# Patient Record
Sex: FEMALE | Race: WHITE
[De-identification: names, ages, dates, MRNs, and addresses within clinical notes are randomized per-mention and may not be internally consistent; named-entity substitution may affect disease eponyms.]

---

## 2021-01-27 ENCOUNTER — TRANSCRIPTION ENCOUNTER (OUTPATIENT)
Age: 46
End: 2021-01-27

## 2022-11-09 PROBLEM — Z00.00 ENCOUNTER FOR PREVENTIVE HEALTH EXAMINATION: Status: ACTIVE | Noted: 2022-11-09

## 2022-11-11 ENCOUNTER — APPOINTMENT (OUTPATIENT)
Dept: PSYCHIATRY | Facility: CLINIC | Age: 47
End: 2022-11-11

## 2022-11-11 PROCEDURE — 90791 PSYCH DIAGNOSTIC EVALUATION: CPT | Mod: 95

## 2022-11-16 ENCOUNTER — APPOINTMENT (OUTPATIENT)
Dept: PSYCHIATRY | Facility: CLINIC | Age: 47
End: 2022-11-16

## 2022-11-16 PROCEDURE — 90837 PSYTX W PT 60 MINUTES: CPT | Mod: 95

## 2022-11-30 ENCOUNTER — NON-APPOINTMENT (OUTPATIENT)
Age: 47
End: 2022-11-30

## 2022-12-02 ENCOUNTER — APPOINTMENT (OUTPATIENT)
Dept: PSYCHIATRY | Facility: CLINIC | Age: 47
End: 2022-12-02

## 2022-12-02 ENCOUNTER — TRANSCRIPTION ENCOUNTER (OUTPATIENT)
Age: 47
End: 2022-12-02

## 2022-12-02 PROCEDURE — 90837 PSYTX W PT 60 MINUTES: CPT | Mod: 95

## 2022-12-09 ENCOUNTER — APPOINTMENT (OUTPATIENT)
Dept: PSYCHIATRY | Facility: CLINIC | Age: 47
End: 2022-12-09

## 2022-12-09 PROCEDURE — 90837 PSYTX W PT 60 MINUTES: CPT | Mod: 95

## 2022-12-16 ENCOUNTER — APPOINTMENT (OUTPATIENT)
Dept: PSYCHIATRY | Facility: CLINIC | Age: 47
End: 2022-12-16

## 2022-12-16 PROCEDURE — 90837 PSYTX W PT 60 MINUTES: CPT | Mod: 95

## 2022-12-23 ENCOUNTER — APPOINTMENT (OUTPATIENT)
Dept: PSYCHIATRY | Facility: CLINIC | Age: 47
End: 2022-12-23

## 2022-12-23 PROCEDURE — 90837 PSYTX W PT 60 MINUTES: CPT | Mod: 95

## 2022-12-30 ENCOUNTER — APPOINTMENT (OUTPATIENT)
Dept: PSYCHIATRY | Facility: CLINIC | Age: 47
End: 2022-12-30
Payer: COMMERCIAL

## 2022-12-30 PROCEDURE — 90837 PSYTX W PT 60 MINUTES: CPT | Mod: 95

## 2023-01-06 ENCOUNTER — APPOINTMENT (OUTPATIENT)
Dept: PSYCHIATRY | Facility: CLINIC | Age: 48
End: 2023-01-06
Payer: COMMERCIAL

## 2023-01-06 PROCEDURE — 90837 PSYTX W PT 60 MINUTES: CPT | Mod: 95

## 2023-01-10 ENCOUNTER — NON-APPOINTMENT (OUTPATIENT)
Age: 48
End: 2023-01-10

## 2023-01-20 ENCOUNTER — APPOINTMENT (OUTPATIENT)
Dept: PSYCHIATRY | Facility: CLINIC | Age: 48
End: 2023-01-20
Payer: COMMERCIAL

## 2023-01-20 PROCEDURE — 90837 PSYTX W PT 60 MINUTES: CPT | Mod: 95

## 2023-01-21 NOTE — PHYSICAL EXAM
[Average] : average [Cooperative] : cooperative [Depressed] : depressed [Anxious] : anxious [Full] : full [Clear] : clear [Linear/Goal Directed] : linear/goal directed [None] : none [None Reported] : none reported [Reexperiencing] : reexperiencing [WNL] : within normal limits [FreeTextEntry1] : adequately groomed, casually dressed [FreeTextEntry8] : sad [de-identified] : mood-congruent, anxious, tearful at times [de-identified] : normal rate/rhythm/volume [FreeTextEntry7] : focused on loss of daughter and trauma triggers

## 2023-01-21 NOTE — REASON FOR VISIT
[Other Location: e.g. School (Enter Location, City,State)___] : at [unfilled], at the time of the visit. [Other Location: e.g. Home (Enter Location, City,State)___] : at [unfilled] [Patient] : Patient [FreeTextEntry1] : "Trauma and grief"
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
## 2023-01-21 NOTE — RISK ASSESSMENT
[Yes] : Yes [No] : No [FreeTextEntry8] : Pt reported her passive SI is decreasing; she is finding she is more survival-oriented and wants to live. Pt was future-oriented.

## 2023-01-27 ENCOUNTER — APPOINTMENT (OUTPATIENT)
Dept: PSYCHIATRY | Facility: CLINIC | Age: 48
End: 2023-01-27
Payer: COMMERCIAL

## 2023-01-27 PROCEDURE — 90837 PSYTX W PT 60 MINUTES: CPT | Mod: 95

## 2023-01-27 NOTE — PHYSICAL EXAM
[Average] : average [Cooperative] : cooperative [Depressed] : depressed [Anxious] : anxious [Full] : full [Clear] : clear [Linear/Goal Directed] : linear/goal directed [None] : none [None Reported] : none reported [Reexperiencing] : reexperiencing [WNL] : within normal limits [FreeTextEntry1] : adequately groomed, casually dressed [FreeTextEntry8] : sad [de-identified] : mood-congruent, anxious, slightly tearful [de-identified] : normal rate/rhythm/volume [FreeTextEntry7] : focused on loss of daughter

## 2023-01-27 NOTE — RISK ASSESSMENT
[Yes] : Yes [No] : No [FreeTextEntry8] : Pt denied SI, including passive SI. She stated she is less reckless and more survival-oriented and wants to live.

## 2023-01-27 NOTE — PLAN
[Prolonged Exposure] : Prolonged Exposure  [Recommended Frequency of Visits: ____] : Recommended frequency of visits: [unfilled] [Return in ____ week(s)] : Return in [unfilled] week(s) [FreeTextEntry2] : \par Problem 1: Re-experiencing sx (i.e., intrusive memories, flashbacks, reactivity to triggers)\par Goal 1: 30% reduction in re-experiencing sx\par \par Problem 2: Negative thoughts and self-blame\par Goal 2: 30% reduction in negative thoughts and self-blame; 30% increase in helpful thinking and peace/acceptance around the loss\par \par Problem 3: Avoidance of locations and situations (e.g., rooms in the apartment, driving over bridges)\par Goal 3: 30% reduction in avoidance of locations and situations\par  [de-identified] : This was PE Session 3. Pt reported she is still experiencing intense anxiety and panic, and she worries it is interfering with her grieving process and PTSD tx. Pt was interested in medication changes. Pt was receptive to writer's offer to make a referral to a [facility] psychiatrist, as she is currently prescribed psych meds by her PCP. Otherwise pt reported the week was hard; she was very triggered by music and made her partner turn it off. Pt completed the HW to conduct her first in vivo exposure, which was to her daughter's bed. She did the exposure one time, with SUDS of 9 before, 2 during, and 2 after the exposure. Pt stated the anticipation was worse than the exposure itself; it brought up more grief than trauma. Writer praised pt completing the first exposure. HW was assigned for pt to conduct next in vivo exposure, to her daughter's clothing and makeup, 2x prior to the next session.  [FreeTextEntry1] : Continue weekly individual therapy. Continue PE Session 3 activities during next session. Continue to monitor sx and measure progress using self-report measures and HW.\par \par Writer will also make a referral for the pt to be evaluated by a [facility] psychiatrist.

## 2023-02-03 ENCOUNTER — APPOINTMENT (OUTPATIENT)
Dept: PSYCHIATRY | Facility: CLINIC | Age: 48
End: 2023-02-03
Payer: COMMERCIAL

## 2023-02-03 PROCEDURE — 90837 PSYTX W PT 60 MINUTES: CPT | Mod: 95

## 2023-02-04 NOTE — PLAN
[Prolonged Exposure] : Prolonged Exposure  [Recommended Frequency of Visits: ____] : Recommended frequency of visits: [unfilled] [Return in ____ week(s)] : Return in [unfilled] week(s) [FreeTextEntry2] : \par Problem 1: Re-experiencing sx (i.e., intrusive memories, flashbacks, reactivity to triggers)\par Goal 1: 30% reduction in re-experiencing sx\par \par Problem 2: Negative thoughts and self-blame\par Goal 2: 30% reduction in negative thoughts and self-blame; 30% increase in helpful thinking and peace/acceptance around the loss\par \par Problem 3: Avoidance of locations and situations (e.g., rooms in the apartment, driving over bridges)\par Goal 3: 30% reduction in avoidance of locations and situations [de-identified] : This was the continuation of PE Session 3. Pt reported she is having a very challenging week due to daughter's 7-month death anniversary; her grief has been intense. Pt stated fortunately she has not had as much panic, but is feeling broken, wondering how to find purpose now as her life revolved around her child. Pt reported she did not do the HW to conduct in vivo exposure to her daughter's clothing and makeup, because she could not go to sister's house where these items are located, due to a sick family member. Pt stated she substituted another in vivo exposure instead; she went to the local grocery store 2x. Pt stated the anticipation was worse than the exposures themselves, her SUDS were 6 prior to going to the store, but fell rapidly to 2-3 during and 1 after exposure. HW was assigned to conduct next in vivo exposure, to taking a long shower 3x prior to the next session.  [FreeTextEntry1] : Continue weekly individual therapy. Continue PE Session 3 activities during next session. Continue to monitor sx and measure progress using self-report measures and HW.\par \par Pt is also scheduled to see [facility] psychiatrist Dr. Yg Palacio next week for an initial evaluation.

## 2023-02-04 NOTE — RISK ASSESSMENT
[No, patient denies ideation or behavior] : No, patient denies ideation or behavior [Yes] : Yes [No] : No [FreeTextEntry8] : Pt denied current SIIP. She reported she scratched herself a lot without realizing it, was itchy and her skin turned red but no blood was drawn. Pt stated she did not do this consciously or with any intent to hurt herself, and she denied SIB and urges to self-harm.

## 2023-02-04 NOTE — PHYSICAL EXAM
[Average] : average [Cooperative] : cooperative [Anxious] : anxious [Full] : full [Clear] : clear [Linear/Goal Directed] : linear/goal directed [None] : none [None Reported] : none reported [Reexperiencing] : reexperiencing [WNL] : within normal limits [FreeTextEntry1] : adequately groomed, casually dressed [FreeTextEntry8] : very sad and depressed [de-identified] : mood-congruent, anxious, tearful [de-identified] : normal rate/rhythm/volume [FreeTextEntry7] : focused on loss of daughter

## 2023-02-09 ENCOUNTER — NON-APPOINTMENT (OUTPATIENT)
Age: 48
End: 2023-02-09

## 2023-02-09 ENCOUNTER — APPOINTMENT (OUTPATIENT)
Dept: PSYCHIATRY | Facility: CLINIC | Age: 48
End: 2023-02-09
Payer: COMMERCIAL

## 2023-02-10 ENCOUNTER — APPOINTMENT (OUTPATIENT)
Dept: PSYCHIATRY | Facility: CLINIC | Age: 48
End: 2023-02-10
Payer: COMMERCIAL

## 2023-02-10 PROCEDURE — 90837 PSYTX W PT 60 MINUTES: CPT | Mod: 95

## 2023-02-15 ENCOUNTER — APPOINTMENT (OUTPATIENT)
Dept: PSYCHIATRY | Facility: CLINIC | Age: 48
End: 2023-02-15

## 2023-02-15 PROCEDURE — 99205 OFFICE O/P NEW HI 60 MIN: CPT | Mod: 95

## 2023-02-15 NOTE — PLAN
[FreeTextEntry4] : 1. Initiate Mirtazapine 7.5 mg qHS for 4 days, then increase to 15 mg qHS for 10 days\par 2. Initiate Ramelteon 8 mg qHS\par 3. Will decrease Trintellix at the following appointment

## 2023-02-15 NOTE — PHYSICAL EXAM
[None] : none [Cooperative] : cooperative [Depressed] : depressed [Labile] : labile [Clear] : clear [Linear/Goal Directed] : linear/goal directed [Depressive] : depressive [Self-Deprecatory] : self-deprecatory [None Reported] : none reported [Average] : average [WNL] : within normal limits

## 2023-02-15 NOTE — DISCUSSION/SUMMARY
[Low acute suicide risk] : Low acute suicide risk [Yes] : Yes [FreeTextEntry1] : Pt has risk factors including recent loss, history of trauma, family history of suicide, history of anxiety and mood disorder however she also has the protective factors of never having attempted suicide, no current active or passive SIIP/HIIP, identifies reasons for living, employed status, supportive family, no access to firearms, no history of self injurious behaviors, and beloved pets. While pt is at chronically elevated risk for self harm, pt is able to care for herself, is not acutely suicidal or homicidal. Pt does not appear to be at imminent risk to self or others at this time and is appropriate for outpt follow up.

## 2023-02-15 NOTE — FAMILY HISTORY
[FreeTextEntry1] : Daughter with depression, anxiety. Daughter completed suicide (as discussed above)
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
## 2023-02-15 NOTE — REASON FOR VISIT
[FreeTextEntry1] : Mary Ellen is a 47 year-old partnered female, domiciled with her partner, past psychiatric history of panic disorder, no prior suicide attempts, no history of psychiatric hospitalizations presenting with ptsd, depressive symptoms, anxiety and panic symptoms following the loss of her 22 year-old daughter to suicide in July 2022
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
## 2023-02-24 ENCOUNTER — APPOINTMENT (OUTPATIENT)
Dept: PSYCHIATRY | Facility: CLINIC | Age: 48
End: 2023-02-24
Payer: COMMERCIAL

## 2023-02-24 PROCEDURE — 90837 PSYTX W PT 60 MINUTES: CPT | Mod: 95

## 2023-02-26 NOTE — PLAN
[Prolonged Exposure] : Prolonged Exposure  [Recommended Frequency of Visits: ____] : Recommended frequency of visits: [unfilled] [FreeTextEntry2] : \par Problem 1: Re-experiencing sx (i.e., intrusive memories, flashbacks, reactivity to triggers)\par Goal 1: 30% reduction in re-experiencing sx\par \par Problem 2: Negative thoughts and self-blame\par Goal 2: 30% reduction in negative thoughts and self-blame; 30% increase in helpful thinking and peace/acceptance around the loss\par \par Problem 3: Avoidance of locations and situations (e.g., rooms in the apartment, driving over bridges)\par Goal 3: 30% reduction in avoidance of locations and situations [de-identified] : This was the continuation of PE Session 3. Pt reported she continues to attend [facility] survivors of suicide group and will have coffee with another bereaved mother. Pt stated she is still very reactive to triggers, burst out crying and screaming when she heard music her late daughter liked. Pt reported she made an inappropriate comment to a coworker, which validated her decision to continue WFH. Pt completed the HW to conduct in vivo exposure to long showers 3x. Her SUDS trended downward from 10 to 8 to 7 during the exposure. Pt stated initially she had intrusive memories in the shower, but was then more grief-stricken, crying and bargaining. Writer reinforced pt's hard work on the exposure and highlighted the decrease in SUDS. Writer also began to explain rationale for imaginal exposure. HW was assigned to continue in vivo exposures to long shower 6x prior to the next session.  [Return in ____ week(s)] : Return in [unfilled] week(s) [FreeTextEntry1] : Continue weekly individual therapy. Continue PE Session 3 activities during next session. Continue to monitor sx and measure progress using self-report measures and HW.\par \par Due to writer's planned vacation next week, the next session will be held in 2 weeks. \par \par Pt missed her initial evaluation with [facility] psychiatrist Dr. Yg Palacio this week. The appointment is rescheduled for next week.

## 2023-02-26 NOTE — PLAN
[Prolonged Exposure] : Prolonged Exposure  [Recommended Frequency of Visits: ____] : Recommended frequency of visits: [unfilled] [Return in ____ week(s)] : Return in [unfilled] week(s) [FreeTextEntry2] : \par Problem 1: Re-experiencing sx (i.e., intrusive memories, flashbacks, reactivity to triggers)\par Goal 1: 30% reduction in re-experiencing sx\par \par Problem 2: Negative thoughts and self-blame\par Goal 2: 30% reduction in negative thoughts and self-blame; 30% increase in helpful thinking and peace/acceptance around the loss\par \par Problem 3: Avoidance of locations and situations (e.g., rooms in the apartment, driving over bridges)\par Goal 3: 30% reduction in avoidance of locations and situations\par  [FreeTextEntry1] : Continue weekly individual therapy with this writer, and monthly follow-up with Dr. Palacio for psychopharmacology. \par \par Providers will speak soon for collateral and to coordinate care. \par \par Continue PE Session 3 activities during next session. Continue to monitor sx and measure progress using self-report measures and HW. [de-identified] : This was the continuation of PE Session 3. Pt reported she had initial eval with [facility] psychiatrist and it went well, she is now on a trial of Mirtazapine. Pt stated she has not had panic, however worries the medication may be dulling her emotions. Pt completed the HW to conduct in vivo exposure to long showers 5x. Her SUDS went down significantly, from 8 during the first exposure to 3 during the subsequent exposures. Pt stated the first exposure was hardest because it was Lopez's Day, which had special meaning for her in regards to her daughter. Pt stated both trauma and grief came up during exposures. Writer praised her hard work and persistence. Of note, pt reiterated her guilt and self-blame for daughter's suicide, however she was receptive to gentle Socratic questioning. HW was assigned to conduct new in vivo exposure to daughter's clothing 2-3x prior to the next session.

## 2023-02-26 NOTE — PHYSICAL EXAM
[Average] : average [Cooperative] : cooperative [Anxious] : anxious [Full] : full [Clear] : clear [Linear/Goal Directed] : linear/goal directed [None] : none [None Reported] : none reported [Reexperiencing] : reexperiencing [WNL] : within normal limits [Depressed] : depressed [FreeTextEntry1] : adequately groomed, casually dressed [FreeTextEntry8] : guilty [de-identified] : mood-congruent, anxious, sad [de-identified] : normal rate/rhythm/volume [FreeTextEntry7] : focused on loss of daughter

## 2023-02-26 NOTE — RISK ASSESSMENT
[No, patient denies ideation or behavior] : No, patient denies ideation or behavior [Yes] : Yes [No] : No [FreeTextEntry8] : Pt denied current SIIP.

## 2023-02-26 NOTE — PHYSICAL EXAM
[Average] : average [Cooperative] : cooperative [Depressed] : depressed [Anxious] : anxious [Full] : full [Clear] : clear [Linear/Goal Directed] : linear/goal directed [None] : none [None Reported] : none reported [Reexperiencing] : reexperiencing [WNL] : within normal limits [FreeTextEntry1] : adequately groomed, casually dressed [FreeTextEntry8] : guilty [de-identified] : mood-congruent, anxious, tearful at times [de-identified] : normal rate/rhythm/volume [FreeTextEntry7] : focused on loss of daughter

## 2023-03-01 ENCOUNTER — NON-APPOINTMENT (OUTPATIENT)
Age: 48
End: 2023-03-01

## 2023-03-01 ENCOUNTER — APPOINTMENT (OUTPATIENT)
Dept: PSYCHIATRY | Facility: CLINIC | Age: 48
End: 2023-03-01
Payer: COMMERCIAL

## 2023-03-01 PROCEDURE — 99214 OFFICE O/P EST MOD 30 MIN: CPT | Mod: 95

## 2023-03-03 ENCOUNTER — APPOINTMENT (OUTPATIENT)
Dept: PSYCHIATRY | Facility: CLINIC | Age: 48
End: 2023-03-03
Payer: COMMERCIAL

## 2023-03-03 PROCEDURE — 90837 PSYTX W PT 60 MINUTES: CPT | Mod: 95

## 2023-03-03 NOTE — PLAN
[Prolonged Exposure] : Prolonged Exposure  [Recommended Frequency of Visits: ____] : Recommended frequency of visits: [unfilled] [Return in ____ week(s)] : Return in [unfilled] week(s) [FreeTextEntry2] : \par Problem 1: Re-experiencing sx (i.e., intrusive memories, flashbacks, reactivity to triggers)\par Goal 1: 30% reduction in re-experiencing sx\par \par Problem 2: Negative thoughts and self-blame\par Goal 2: 30% reduction in negative thoughts and self-blame; 30% increase in helpful thinking and peace/acceptance around the loss\par \par Problem 3: Avoidance of locations and situations (e.g., rooms in the apartment, driving over bridges)\par Goal 3: 30% reduction in avoidance of locations and situations\par  [de-identified] : Pt almost cancelled this session due to not feeling well emotionally, however writer encouraged the pt to attend the session for this very reason. This was the continuation of PE Session 3. Pt reported feeling her grief and guilt very strongly at this time. Pt stated this is a particularly hard day as it is the 8-month anniversary of her daughter's passing. Pt completed the HW to conduct in vivo exposure to her daughter's clothing 1x. Her SUDS before, during, and after the exposure were 9, 9, and 7, respectively. Writer reinforced the pt's first attempt at this exercise and guided her to process what came up during the exposure. Writer provided further explanation of imaginal exposure and writer and pt agreed to conduct first imaginal exposure during the next session. HW was assigned to conduct in vivo exposure to daughter's clothing at least one more time prior to the next session. [FreeTextEntry1] : Continue weekly individual therapy with this writer, and monthly follow-up with Dr. Palacio for psychopharmacology. \par \par Writer spoke with Dr. Yg Palacio on 3/1 for collateral and to coordinate care. Providers discussed the tx plan and agreed the pt is motivated and resilient, is working hard to achieve her tx goals, and displays minimal risk. \par \par Continue PE Session 3 activities during next session. Continue to monitor sx and measure progress using self-report measures and HW.

## 2023-03-03 NOTE — PHYSICAL EXAM
[Average] : average [Cooperative] : cooperative [Depressed] : depressed [Anxious] : anxious [Full] : full [Clear] : clear [Linear/Goal Directed] : linear/goal directed [None Reported] : none reported [Reexperiencing] : reexperiencing [WNL] : within normal limits [Preoccupations/Ruminations] : preoccupations/ruminations [FreeTextEntry1] : adequately groomed, casually dressed [FreeTextEntry8] : very guilty [de-identified] : mood-congruent, anxious, tearful at times [de-identified] : normal rate/rhythm/volume [FreeTextEntry7] : focused on loss of daughter

## 2023-03-03 NOTE — RISK ASSESSMENT
[No] : No [No, patient denies ideation or behavior] : No, patient denies ideation or behavior [FreeTextEntry8] : Pt denied current SIIP.

## 2023-03-10 ENCOUNTER — APPOINTMENT (OUTPATIENT)
Dept: PSYCHIATRY | Facility: CLINIC | Age: 48
End: 2023-03-10
Payer: COMMERCIAL

## 2023-03-10 PROCEDURE — 90837 PSYTX W PT 60 MINUTES: CPT | Mod: 95

## 2023-03-10 NOTE — PLAN
[Prolonged Exposure] : Prolonged Exposure  [Recommended Frequency of Visits: ____] : Recommended frequency of visits: [unfilled] [Return in ____ week(s)] : Return in [unfilled] week(s) [FreeTextEntry2] : \par Problem 1: Re-experiencing sx (i.e., intrusive memories, flashbacks, reactivity to triggers)\par Goal 1: 30% reduction in re-experiencing sx\par \par Problem 2: Negative thoughts and self-blame\par Goal 2: 30% reduction in negative thoughts and self-blame; 30% increase in helpful thinking and peace/acceptance around the loss\par \par Problem 3: Avoidance of locations and situations (e.g., rooms in the apartment, driving over bridges)\par Goal 3: 30% reduction in avoidance of locations and situations\par  [de-identified] : This was the conclusion of PE Session 3. Session focused on reviewing HW and conducting first imaginal exposure. Pt completed the HW to conduct in vivo exposure to her daughter's clothing for the second time. Her SUDS before, during, and after the exposure were 5, 10, and 5, respectively. Pt reported this exposure triggered memories and profound grief. Pt stated otherwise she continues to take showers and maintain her hygiene. Writer provided instructions and pt conducted her first imaginal exposure, to memory of losing her daughter. Her SUDS before exposure was 10, highest SUDS during exposure was 9, and SUDS after exposure was 7. Writer and pt processed the exposure. Writer introduced memory exposure HW form and assigned HW for pt to listen to exposure tape 1-2x prior to next session. At end of session, writer praised pt's hard work and commitment to this process. [FreeTextEntry1] : Continue weekly individual therapy with this writer, and monthly follow-up with Dr. Palacio for psychopharmacology. \par \par Next session will be the first of PE intermediate sessions. Continue to monitor sx and measure progress using self-report measures and HW.

## 2023-03-10 NOTE — PHYSICAL EXAM
[Average] : average [Cooperative] : cooperative [Depressed] : depressed [Anxious] : anxious [Full] : full [Clear] : clear [Linear/Goal Directed] : linear/goal directed [None] : none [None Reported] : none reported [Reexperiencing] : reexperiencing [WNL] : within normal limits [FreeTextEntry1] : well-groomed, casually dressed [FreeTextEntry8] : extremely sad, grief-stricken [de-identified] : mood-congruent, anxious, very sad and very tearful at times [de-identified] : normal rate/rhythm/volume [FreeTextEntry7] : focused on loss of daughter and the events of that day

## 2023-03-10 NOTE — END OF VISIT
[Duration of Psychotherapy Visit (minutes spent in synchronous communication): ____] : Duration of Psychotherapy Visit (minutes spent in synchronous communication): [unfilled] [Prolonged Visit (90 minutes or longer)] : Prolonged Visit (90 minutes or longer)

## 2023-03-17 ENCOUNTER — APPOINTMENT (OUTPATIENT)
Dept: PSYCHIATRY | Facility: CLINIC | Age: 48
End: 2023-03-17
Payer: COMMERCIAL

## 2023-03-17 PROCEDURE — 90837 PSYTX W PT 60 MINUTES: CPT | Mod: 95

## 2023-03-18 NOTE — PHYSICAL EXAM
[Average] : average [Cooperative] : cooperative [Depressed] : depressed [Anxious] : anxious [Full] : full [Clear] : clear [Linear/Goal Directed] : linear/goal directed [None] : none [None Reported] : none reported [Reexperiencing] : reexperiencing [WNL] : within normal limits [Well groomed] : well groomed [FreeTextEntry1] : casually dressed [FreeTextEntry8] : sad, lonely [de-identified] : mood-congruent, anxious, tearful at times [de-identified] : normal rate/rhythm/volume [FreeTextEntry7] : focused on loss of daughter and the stigma of suicide

## 2023-03-18 NOTE — REASON FOR VISIT
[Other Location: e.g. Home (Enter Location, City,State)___] : at [unfilled] [Patient] : Patient [Other Location: e.g. School (Enter Location, City,State)___] : at [unfilled], at the time of the visit. [FreeTextEntry1] : "Trauma and grief"

## 2023-03-18 NOTE — PLAN
[Prolonged Exposure] : Prolonged Exposure  [Recommended Frequency of Visits: ____] : Recommended frequency of visits: [unfilled] [Return in ____ week(s)] : Return in [unfilled] week(s) [FreeTextEntry2] : \par Problem 1: Re-experiencing sx (i.e., intrusive memories, flashbacks, reactivity to triggers)\par Goal 1: 30% reduction in re-experiencing sx\par \par Problem 2: Negative thoughts and self-blame\par Goal 2: 30% reduction in negative thoughts and self-blame; 30% increase in helpful thinking and peace/acceptance around the loss\par \par Problem 3: Avoidance of locations and situations (e.g., rooms in the apartment, driving over bridges)\par Goal 3: 30% reduction in avoidance of locations and situations\par  [de-identified] : This was a PE intermediate session. Session focused on reviewing HW and pt's writing. Pt reported she felt an urge to start writing again and wrote a poem about her daughter's loss. Writer invited the pt to share it in session. Pt read her poem out loud and writer and pt processed themes of stigma and isolation. Pt reported she attempted to do the HW to listen to memory exposure tape of the day she lost her daughter, but she stopped about [facility] through because it was too painful. Writer and pt discussed further and ultimately decided to split the memory into 2 distinct parts, the events at home and the events at the hospital. Writer and pt also decided to add grounding techniques to her listening session so that she would not dissociate or experience flashbacks while listening. For HW this week, pt agreed to listen to tape of the first segment of the trauma, the events taking place at home. [FreeTextEntry1] : Continue weekly individual therapy with this writer, and monthly follow-up with Dr. Palacio for psychopharmacology. \par \par Continue with PE intermediate sessions. Continue to monitor sx and measure progress using self-report measures and HW.

## 2023-03-24 ENCOUNTER — APPOINTMENT (OUTPATIENT)
Dept: PSYCHIATRY | Facility: CLINIC | Age: 48
End: 2023-03-24
Payer: COMMERCIAL

## 2023-03-24 PROCEDURE — 90837 PSYTX W PT 60 MINUTES: CPT | Mod: 95

## 2023-03-24 NOTE — PLAN
[Prolonged Exposure] : Prolonged Exposure  [Recommended Frequency of Visits: ____] : Recommended frequency of visits: [unfilled] [Return in ____ week(s)] : Return in [unfilled] week(s) [FreeTextEntry2] : \par Problem 1: Re-experiencing sx (i.e., intrusive memories, flashbacks, reactivity to triggers)\par Goal 1: 30% reduction in re-experiencing sx\par \par Problem 2: Negative thoughts and self-blame\par Goal 2: 30% reduction in negative thoughts and self-blame; 30% increase in helpful thinking and peace/acceptance around the loss\par \par Problem 3: Avoidance of locations and situations (e.g., rooms in the apartment, driving over bridges)\par Goal 3: 30% reduction in avoidance of locations and situations [de-identified] : This was a PE intermediate session. Session focused on reviewing HW and continuing to process the trauma of losing her daughter. Pt completed the new HW to listen to memory exposure tape of the first segment of the trauma, the events taking place at home. Pt stated it was very painful but she made it through this entire segment, with SUDS ratings of 8 before, 10 during, and 9 after the exposure. Pt reported it still felt like a flashback. Writer and pt worked on grounding again and decided on an object that she will hold and touch while listening to the tape. Pt shared thoughts and feelings that came up during the exposure, with themes of failure and self-blame. Writer reinforced her hard work on this exposure. For HW this week, pt agreed to listen to same tape of the first segment of the trauma (events at home) 1x and to conduct in vivo exposure to her late daughter's clothing 1x.  [FreeTextEntry1] : Continue weekly individual therapy with this writer, and monthly follow-up with Dr. Palacio for psychopharmacology. \par \par Continue with PE intermediate sessions. Continue to monitor sx and measure progress using self-report measures and HW.

## 2023-03-24 NOTE — PHYSICAL EXAM
[Well groomed] : well groomed [Average] : average [Cooperative] : cooperative [Depressed] : depressed [Anxious] : anxious [Full] : full [Clear] : clear [Linear/Goal Directed] : linear/goal directed [Preoccupations/Ruminations] : preoccupations/ruminations [None Reported] : none reported [Reexperiencing] : reexperiencing [WNL] : within normal limits [FreeTextEntry1] : casually dressed [FreeTextEntry8] : very sad, hurt [de-identified] : mood-congruent, anxious, tearful at times [de-identified] : normal rate/rhythm/volume [FreeTextEntry7] : focused on loss of daughter

## 2023-03-30 ENCOUNTER — NON-APPOINTMENT (OUTPATIENT)
Age: 48
End: 2023-03-30

## 2023-03-30 ENCOUNTER — APPOINTMENT (OUTPATIENT)
Dept: PSYCHIATRY | Facility: CLINIC | Age: 48
End: 2023-03-30
Payer: COMMERCIAL

## 2023-03-30 PROCEDURE — 99214 OFFICE O/P EST MOD 30 MIN: CPT | Mod: 95

## 2023-03-30 NOTE — DISCUSSION/SUMMARY
[FreeTextEntry1] :  Mary Ellen is a 47 year-old partnered female, domiciled with her partner, past psychiatric history of panic disorder, no prior suicide attempts, no history of psychiatric hospitalizations presenting with ptsd, depressive symptoms, anxiety and panic symptoms following the loss of her 22 year-old daughter to suicide in July 2022. Pt is presenting with depressive symptoms including poor sleep, anhedonia and difficulty concentrating. Pt reports that her sleep is not interrupted by nightmares and does not have nightmares. Pt has been having anxiety and panic attacks (3-4 per week). She is being prescribed Trintellix 20 mg but feels that it's not helpful. Given that pt has had a trial of an SSRI which stopped helping her panic attacks and has not had significant improvement with Trintellix, discussed trying a different anti depressant such as mirtazepine to help with her anxiety and depressive symptoms. Discussed risks vs benefits of medication. Pt agreed to trial of mirtazepine. Discussed starting Ramelteon to assist with sleep and pt agreed. \par \par On interview today pt reports that her sleep and and anxiety are improved with 7.5 mg Mirtazepine and 20 mg Ramelteon. Reports that she had trouble obtaining a prescription for Ramelteon but states that it might be filled if 4 mg pills were sent instead of 8 mg pills. Denies active or passive SIIP/HIIP.\par

## 2023-03-30 NOTE — PHYSICAL EXAM
[Cooperative] : cooperative [Depressed] : depressed [Full] : full [Clear] : clear [Linear/Goal Directed] : linear/goal directed [None] : none [None Reported] : none reported [Average] : average [WNL] : within normal limits

## 2023-03-30 NOTE — REASON FOR VISIT
[Patient preference] : as per patient preference [Patient] : Patient [FreeTextEntry4] : 2:00 [FreeTextEntry5] : 2:30

## 2023-03-30 NOTE — PLAN
[Medication education provided] : Medication education provided. [Rationale for medication choices, possible risks/precautions, benefits, alternative treatment choices, and consequences of non-treatment discussed] : Rationale for medication choices, possible risks/precautions, benefits, alternative treatment choices, and consequences of non-treatment discussed with patient/family/caregiver  [FreeTextEntry5] : 1.Continue Mirtazapine 7.5 mg \par 2.Continue Ramelteon 8 mg qHS\par 3. Continue Trintellix 20 mg, will consider consolidating medications and completely switching to Mirtazapine in the future once pt is out of the acute phase of therapy treatment and has had sustained improvement of her symptoms.

## 2023-03-30 NOTE — PHYSICAL EXAM
[None] : none [Average] : average [Cooperative] : cooperative [Depressed] : depressed [Full] : full [Clear] : clear [Linear/Goal Directed] : linear/goal directed [WNL] : within normal limits

## 2023-03-30 NOTE — HISTORY OF PRESENT ILLNESS
[FreeTextEntry1] : -reports that things have been "alright", states that she's been doing a lot of work with her therapist\par -feels that anxiety is well controlled\par -she states that feels a little more clumsy lately\par -states that her thoughts feel a little cloudy\par -she stopped having panic attacks\par -pt is able to sleep through the night\par -denies active or passive SIIP/HIIP

## 2023-03-30 NOTE — RISK ASSESSMENT
[No, patient denies ideation or behavior] : No, patient denies ideation or behavior [FreeTextEntry9] : Risk assessment: Pt has risk factors including recent loss, history of trauma, family history of suicide, history of anxiety and mood disorder however she also has the protective factors of never having attempted suicide, no current active or passive SIIP/HIIP, identifies reasons for living, employed status, supportive family, no access to firearms, no history of self injurious behaviors, and beloved pets. While pt is at chronically elevated risk for self harm, pt is able to care for herself, is not acutely suicidal or homicidal. Pt does not appear to be at imminent risk to self or others at this time and is appropriate for outpt follow up [Low acute suicide risk] : Low acute suicide risk [Yes] : Yes [Not clinically indicated] : Safety Plan completed/updated (for individuals at risk): Not clinically indicated

## 2023-03-30 NOTE — HISTORY OF PRESENT ILLNESS
[FreeTextEntry1] : -pt reports that she's been doing well\par -now taking 15 mg mirtazepine qHS + 20 mg trintellix\par -reports that she has trouble falling asleep but initially the mirtazepine was causing sedation which was helpful\par -anxiety has been "ok" and she feels that medications has been helpful\par -pt reports that the medication has been making her feel indifferent \par -denies active or passive SIIP/HIIP

## 2023-03-31 ENCOUNTER — APPOINTMENT (OUTPATIENT)
Dept: PSYCHIATRY | Facility: CLINIC | Age: 48
End: 2023-03-31
Payer: COMMERCIAL

## 2023-03-31 PROCEDURE — 90837 PSYTX W PT 60 MINUTES: CPT | Mod: 95

## 2023-04-01 NOTE — PLAN
[Prolonged Exposure] : Prolonged Exposure  [Recommended Frequency of Visits: ____] : Recommended frequency of visits: [unfilled] [Return in ____ week(s)] : Return in [unfilled] week(s) [FreeTextEntry2] : \par Problem 1: Re-experiencing sx (i.e., intrusive memories, flashbacks, reactivity to triggers)\par Goal 1: 30% reduction in re-experiencing sx\par \par Problem 2: Negative thoughts and self-blame\par Goal 2: 30% reduction in negative thoughts and self-blame; 30% increase in helpful thinking and peace/acceptance around the loss\par \par Problem 3: Avoidance of locations and situations (e.g., rooms in the apartment, driving over bridges)\par Goal 3: 30% reduction in avoidance of locations and situations\par  [FreeTextEntry1] : Continue weekly individual therapy with this writer, and monthly follow-up with Dr. Palacio for psychopharmacology. \par \par Continue with PE intermediate sessions. Continue to monitor sx and measure progress using self-report measures and HW.  [de-identified] : This was a PE intermediate session. Session focused on reviewing HW and continuing to process the trauma of losing her daughter. Pt completed the HW to conduct in vivo exposure to her daughter's clothing, specifically her favorite hoodie, with SUDS ratings of 10 before, 10 during, and 8 after the exposure. Pt also completed the HW to listen to memory exposure tape of the first segment of the trauma, the events taking place at home, with SUDS ratings of 8 before, 9 during, and 7 after the exposure. Pt stated the use of grounding during the exposure was helpful. Pt identified the most challenging parts of the recording and what makes them upsetting. Writer reinforced the pt's hard work. For HW this week, pt agreed to conduct the same memory exposure and in vivo exposure again. Writer and pt made plan to make a new recording of the first segment of the trauma during next week's session.

## 2023-04-01 NOTE — PHYSICAL EXAM
[Well groomed] : well groomed [Average] : average [Cooperative] : cooperative [Depressed] : depressed [Anxious] : anxious [Full] : full [Clear] : clear [Linear/Goal Directed] : linear/goal directed [Preoccupations/Ruminations] : preoccupations/ruminations [None Reported] : none reported [Reexperiencing] : reexperiencing [WNL] : within normal limits [FreeTextEntry1] : casually dressed [FreeTextEntry8] : very sad, hurt [de-identified] : mood-congruent, anxious, tearful at times but could also smile [de-identified] : normal rate/rhythm/volume [FreeTextEntry7] : focused on loss of daughter

## 2023-04-07 ENCOUNTER — APPOINTMENT (OUTPATIENT)
Dept: PSYCHIATRY | Facility: CLINIC | Age: 48
End: 2023-04-07

## 2023-04-07 ENCOUNTER — APPOINTMENT (OUTPATIENT)
Dept: PSYCHIATRY | Facility: CLINIC | Age: 48
End: 2023-04-07
Payer: COMMERCIAL

## 2023-04-07 PROCEDURE — 90837 PSYTX W PT 60 MINUTES: CPT | Mod: 95

## 2023-04-08 NOTE — PHYSICAL EXAM
[Well groomed] : well groomed [Average] : average [Cooperative] : cooperative [Depressed] : depressed [Anxious] : anxious [Full] : full [Linear/Goal Directed] : linear/goal directed [Clear] : clear [Preoccupations/Ruminations] : preoccupations/ruminations [None Reported] : none reported [Reexperiencing] : reexperiencing [WNL] : within normal limits [FreeTextEntry1] : casually dressed [FreeTextEntry8] : very sad, very guilty [de-identified] : mood-congruent, anxious, very tearful at times, particularly during imaginal exposure [FreeTextEntry7] : focused on loss of daughter [de-identified] : normal rate/rhythm/volume

## 2023-04-08 NOTE — PLAN
[Prolonged Exposure] : Prolonged Exposure  [Recommended Frequency of Visits: ____] : Recommended frequency of visits: [unfilled] [Return in ____ week(s)] : Return in [unfilled] week(s) [de-identified] : This was a PE intermediate session. Session focused on reviewing HW and conducting second imaginal exposure. Pt reported Monday was the 9-month anniversary of losing her daughter to suicide. Pt stated she is still trying to figure out who she is and what she believes in now that her daughter is gone. Pt completed the HW to conduct in vivo exposure to her daughter's clothing, specifically her favorite hoodie, with SUDS ratings of 7 before, 9 during, and 6 after the exposure. Pt also completed the HW to listen to memory exposure tape of the first segment of the trauma, the events taking place at home, with SUDS ratings of 8 before, 8 during, and 6 after the exposure. Pt stated the memory exposure no longer felt like a flashback, but it still hurt her. Writer provided instructions and pt conducted her second imaginal exposure, to the events that happened at home on the day she lost her daughter. Her SUDS before exposure was 8, highest SUDS during exposure was 9, and SUDS after exposure was 8. Writer and pt processed the exposure. Writer reinforced the pt's hard work and commitment. For HW this week, pt agreed to conduct memory exposure to the new recording and to continue in vivo exposures to daughter's clothing, selecting a different clothing item this time.  [FreeTextEntry2] : \par Problem 1: Re-experiencing sx (i.e., intrusive memories, flashbacks, reactivity to triggers)\par Goal 1: 30% reduction in re-experiencing sx\par \par Problem 2: Negative thoughts and self-blame\par Goal 2: 30% reduction in negative thoughts and self-blame; 30% increase in helpful thinking and peace/acceptance around the loss\par \par Problem 3: Avoidance of locations and situations (e.g., rooms in the apartment, driving over bridges)\par Goal 3: 30% reduction in avoidance of locations and situations\par  [FreeTextEntry1] : Continue weekly individual therapy with this writer, and monthly follow-up with Dr. Palacio for psychopharmacology. \par \par Continue with PE intermediate sessions. Continue to monitor sx and measure progress using self-report measures and HW.

## 2023-04-14 ENCOUNTER — APPOINTMENT (OUTPATIENT)
Dept: PSYCHIATRY | Facility: CLINIC | Age: 48
End: 2023-04-14
Payer: COMMERCIAL

## 2023-04-14 PROCEDURE — 90837 PSYTX W PT 60 MINUTES: CPT | Mod: 95

## 2023-04-14 NOTE — PLAN
[Prolonged Exposure] : Prolonged Exposure  [Recommended Frequency of Visits: ____] : Recommended frequency of visits: [unfilled] [Return in ____ week(s)] : Return in [unfilled] week(s) [de-identified] : This was a PE intermediate session. Session focused on reviewing HW and continuing to process the trauma of losing her daughter. Pt completed the HW to listen to new memory exposure tape of the first segment of the trauma, the events taking place at home. Her SUDS ratings were 7 before, 7 during, and 6 after the exposure. Pt stated this recording was not as good, that she was too distanced from the trauma. Writer and pt discussed what had compromised the recording and agreed to do another imaginal exposure soon. Pt processed the guilt and self-blame she still feels about these events. Pt also completed the HW to conduct in vivo exposure to her daughter's clothing. She chose a different hoodie this time, with SUDS ratings of 7 before, 8 during, and 7 after the exposure. Pt shared daughter's story around the hoodie. For HW, pt agreed to conduct the same in vivo exposure again.  [FreeTextEntry2] : \par Problem 1: Re-experiencing sx (i.e., intrusive memories, flashbacks, reactivity to triggers)\par Goal 1: 30% reduction in re-experiencing sx\par \par Problem 2: Negative thoughts and self-blame\par Goal 2: 30% reduction in negative thoughts and self-blame; 30% increase in helpful thinking and peace/acceptance around the loss\par \par Problem 3: Avoidance of locations and situations (e.g., rooms in the apartment, driving over bridges)\par Goal 3: 30% reduction in avoidance of locations and situations [FreeTextEntry1] : Continue weekly individual therapy with this writer, and monthly follow-up with Dr. Palacio for psychopharmacology. \par \par Continue with PE intermediate sessions. Continue to monitor sx and measure progress using self-report measures and HW.

## 2023-04-14 NOTE — PHYSICAL EXAM
[Well groomed] : well groomed [Average] : average [Cooperative] : cooperative [Depressed] : depressed [Anxious] : anxious [Full] : full [Clear] : clear [Linear/Goal Directed] : linear/goal directed [Preoccupations/Ruminations] : preoccupations/ruminations [None Reported] : none reported [Reexperiencing] : reexperiencing [WNL] : within normal limits [FreeTextEntry1] : casually dressed [FreeTextEntry8] : sad, frustrated, guilty [de-identified] : mood-congruent, anxious, tearful at times [de-identified] : normal rate/rhythm/volume [FreeTextEntry7] : focused on loss of daughter

## 2023-04-18 ENCOUNTER — APPOINTMENT (OUTPATIENT)
Dept: PSYCHIATRY | Facility: CLINIC | Age: 48
End: 2023-04-18
Payer: COMMERCIAL

## 2023-04-18 PROCEDURE — 90834 PSYTX W PT 45 MINUTES: CPT | Mod: 95

## 2023-04-19 NOTE — PHYSICAL EXAM
[Well groomed] : well groomed [Average] : average [Cooperative] : cooperative [Depressed] : depressed [Anxious] : anxious [Full] : full [Clear] : clear [Linear/Goal Directed] : linear/goal directed [None] : none [None Reported] : none reported [Reexperiencing] : reexperiencing [WNL] : within normal limits [FreeTextEntry1] : casually dressed [FreeTextEntry8] : sad, lonely, guilty, scared [de-identified] : mood-congruent, anxious, tearful at times [de-identified] : normal rate/rhythm/volume [FreeTextEntry7] : focused on loss of daughter

## 2023-04-19 NOTE — PLAN
[Prolonged Exposure] : Prolonged Exposure  [Recommended Frequency of Visits: ____] : Recommended frequency of visits: [unfilled] [Return in ____ week(s)] : Return in [unfilled] week(s) [FreeTextEntry2] : \par Problem 1: Re-experiencing sx (i.e., intrusive memories, flashbacks, reactivity to triggers)\par Goal 1: 30% reduction in re-experiencing sx\par \par Problem 2: Negative thoughts and self-blame\par Goal 2: 30% reduction in negative thoughts and self-blame; 30% increase in helpful thinking and peace/acceptance around the loss\par \par Problem 3: Avoidance of locations and situations (e.g., rooms in the apartment, driving over bridges)\par Goal 3: 30% reduction in avoidance of locations and situations\par  [de-identified] : Pt was late due to technical difficulties. This was a PE intermediate session. Session focused on in vivo exposures. Pt described her recent experience of grief and the pain she anticipates living the rest of her life without her daughter. Pt was still hopeful for some improvement in her condition. Pt completed the HW to conduct in vivo exposure to her daughter's denisse, with SUDS ratings of 5 before, 7 during, and 5 after the exposure. Writer and pt agreed the clothing is now bringing out more grief than trauma. Pt's in vivo exposure hierarchy was revisited, and she had already completed some of the items on her own, like her daughter's blanket, and the large supermarket. For HW this week, pt agreed to conduct in vivo exposure to driving on highways 2x. Since she has not driven on highways since her daughter's passing, the pt plans to bring her partner with her for the first drive.  [FreeTextEntry1] : Continue weekly individual therapy with this writer, and monthly follow-up with Dr. Palacio for psychopharmacology. \par \par Continue with PE intermediate sessions. Continue to monitor sx and measure progress using self-report measures and HW.  Spine appears normal, range of motion is not limited, no midline or paraspinal tenderness.

## 2023-04-24 ENCOUNTER — APPOINTMENT (OUTPATIENT)
Dept: PSYCHIATRY | Facility: CLINIC | Age: 48
End: 2023-04-24
Payer: COMMERCIAL

## 2023-04-24 PROCEDURE — 90834 PSYTX W PT 45 MINUTES: CPT | Mod: 95

## 2023-04-24 NOTE — REASON FOR VISIT
[Home] : at home, [unfilled] , at the time of the visit. [Other Location: e.g. Home (Enter Location, City,State)___] : at [unfilled] [Patient] : Patient [FreeTextEntry1] : "Trauma and grief"

## 2023-04-24 NOTE — PHYSICAL EXAM
[Well groomed] : well groomed [Average] : average [Cooperative] : cooperative [Depressed] : depressed [Anxious] : anxious [Full] : full [Clear] : clear [Linear/Goal Directed] : linear/goal directed [None] : none [None Reported] : none reported [Reexperiencing] : reexperiencing [WNL] : within normal limits [FreeTextEntry1] : casually dressed [FreeTextEntry8] : sad, proud [de-identified] : mood-congruent, anxious, tearful at times [de-identified] : normal rate/rhythm/volume [FreeTextEntry7] : focused on loss of daughter

## 2023-04-24 NOTE — PLAN
[Prolonged Exposure] : Prolonged Exposure  [Recommended Frequency of Visits: ____] : Recommended frequency of visits: [unfilled] [Return in ____ week(s)] : Return in [unfilled] week(s) [FreeTextEntry2] : \par Problem 1: Re-experiencing sx (i.e., intrusive memories, flashbacks, reactivity to triggers)\par Goal 1: 30% reduction in re-experiencing sx\par \par Problem 2: Negative thoughts and self-blame\par Goal 2: 30% reduction in negative thoughts and self-blame; 30% increase in helpful thinking and peace/acceptance around the loss\par \par Problem 3: Avoidance of locations and situations (e.g., rooms in the apartment, driving over bridges)\par Goal 3: 30% reduction in avoidance of locations and situations [de-identified] : Pt was late due to technical difficulties. This was a PE intermediate session. Session focused on in vivo exposures and continuing to process the loss of her daughter. Pt reported she starting cooking again, which is something she always did for her daughter. Pt described her daughter's favorite dishes. Pt stated it was hard to cook since this had been so connected to her daughter. Pt also shared other memories of her late daughter, the playful atmosphere she created, and the void the family is feeling now without her. Pt completed the HW to conduct in vivo exposure to driving on the highway. She drove with a friend in the car, and her SUDS were 10 before, 8 during, and 5 after the exposure. Pt felt this was a step toward rebuilding self-trust and reclaiming her independence. For HW this week, pt agreed to conduct in vivo exposure to driving on highways 2 more times, including 1x by herself.  [FreeTextEntry1] : Continue weekly individual therapy with this writer, and monthly follow-up with Dr. Palacio for psychopharmacology. \par \par Continue with PE intermediate sessions. Continue to monitor sx and measure progress using self-report measures and HW.

## 2023-05-04 ENCOUNTER — APPOINTMENT (OUTPATIENT)
Dept: PSYCHIATRY | Facility: CLINIC | Age: 48
End: 2023-05-04

## 2023-05-05 ENCOUNTER — APPOINTMENT (OUTPATIENT)
Dept: PSYCHIATRY | Facility: CLINIC | Age: 48
End: 2023-05-05
Payer: COMMERCIAL

## 2023-05-05 PROCEDURE — 90837 PSYTX W PT 60 MINUTES: CPT | Mod: 95

## 2023-05-07 NOTE — PHYSICAL EXAM
[Well groomed] : well groomed [Average] : average [Cooperative] : cooperative [Depressed] : depressed [Anxious] : anxious [Full] : full [Clear] : clear [Linear/Goal Directed] : linear/goal directed [Preoccupations/Ruminations] : preoccupations/ruminations [None Reported] : none reported [Reexperiencing] : reexperiencing [WNL] : within normal limits [FreeTextEntry1] : casually dressed [FreeTextEntry8] : sad, guilty [de-identified] : mood-congruent, anxious [de-identified] : normal rate/rhythm/volume [FreeTextEntry7] : focused on loss of daughter

## 2023-05-07 NOTE — PLAN
[Prolonged Exposure] : Prolonged Exposure  [Recommended Frequency of Visits: ____] : Recommended frequency of visits: [unfilled] [Return in ____ week(s)] : Return in [unfilled] week(s) [FreeTextEntry2] : \par Problem 1: Re-experiencing sx (i.e., intrusive memories, flashbacks, reactivity to triggers)\par Goal 1: 30% reduction in re-experiencing sx\par \par Problem 2: Negative thoughts and self-blame\par Goal 2: 30% reduction in negative thoughts and self-blame; 30% increase in helpful thinking and peace/acceptance around the loss\par \par Problem 3: Avoidance of locations and situations (e.g., rooms in the apartment, driving over bridges)\par Goal 3: 30% reduction in avoidance of locations and situations [de-identified] : This was a PE intermediate session. Session focused on in vivo exposures and continuing to process the loss of her daughter. Pt reported this week was the 10-month anniversary of losing her daughter to suicide. Pt stated this day was very hard for her, with a lot of crying and regret, and she dreads the one-year anniversary. Pt reported she coped with the anniversary by doing crafts and posting on social media about mental health awareness. Pt completed the HW to conduct in vivo exposure to driving 2x on the highway. She drove both times by herself, and her SUDS were 5 before, 2-3 during, and 1-2 after exposure. Pt recognized the anticipation is still the worst part, but she is growing more confident about highway driving. For HW this week, pt agreed to conduct in vivo exposure to driving on highways 1 more time, and plan was made to conduct imaginal exposure during next session.   [FreeTextEntry1] : Continue weekly individual therapy with this writer, and monthly follow-up with Dr. Palacio for psychopharmacology. \par \par Continue with PE intermediate sessions. Continue to monitor sx and measure progress using self-report measures and HW.

## 2023-05-07 NOTE — RISK ASSESSMENT
[Yes] : Yes [No] : No [FreeTextEntry8] : Pt endorsed passive SI occurring 3-4x per week, thinking that she does not want to live life without her daughter. Pt denied any active SI, and she denied intent and plan.

## 2023-05-12 ENCOUNTER — APPOINTMENT (OUTPATIENT)
Dept: PSYCHIATRY | Facility: CLINIC | Age: 48
End: 2023-05-12
Payer: COMMERCIAL

## 2023-05-12 PROCEDURE — 90837 PSYTX W PT 60 MINUTES: CPT | Mod: 95

## 2023-05-12 NOTE — PLAN
[Prolonged Exposure] : Prolonged Exposure  [Recommended Frequency of Visits: ____] : Recommended frequency of visits: [unfilled] [Return in ____ week(s)] : Return in [unfilled] week(s) [FreeTextEntry2] : \par Problem 1: Re-experiencing sx (i.e., intrusive memories, flashbacks, reactivity to triggers)\par Goal 1: 30% reduction in re-experiencing sx\par \par Problem 2: Negative thoughts and self-blame\par Goal 2: 30% reduction in negative thoughts and self-blame; 30% increase in helpful thinking and peace/acceptance around the loss\par \par Problem 3: Avoidance of locations and situations (e.g., rooms in the apartment, driving over bridges)\par Goal 3: 30% reduction in avoidance of locations and situations [de-identified] : This was a PE intermediate session. Session focused on recent triggers. Pt reported she had a panic attack yesterday and took Xanax prior to session, therefore plan to do imaginal exposure was postponed. Pt was unsure what prompted the panic attack, but she identified possible triggers including bereaved Mother's Day, Mother's Day, changing her beneficiary for life insurance at work, and new concerns about her own burial and what would happen to daughter's urn when she dies. Pt added that she ran out of Mirtazapine and writer informed her she missed her psychiatry appointment, of which she was not aware. Pt completed the HW to conduct in vivo exposure to driving on the highway again, with SUDS of 3 before and 1 during and after exposure. Pt stated she is no longer afraid of highway driving. For HW this week, pt agreed to reschedule psychiatry appointment and get med refills.  [FreeTextEntry1] : Continue weekly individual therapy with this writer, and monthly follow-up with Dr. Palacio for psychopharmacology. \par \par Continue with PE intermediate sessions. Continue to monitor sx and measure progress using self-report measures and HW.

## 2023-05-12 NOTE — PHYSICAL EXAM
[Well groomed] : well groomed [Average] : average [Cooperative] : cooperative [Depressed] : depressed [Anxious] : anxious [Full] : full [Clear] : clear [Linear/Goal Directed] : linear/goal directed [Preoccupations/Ruminations] : preoccupations/ruminations [None Reported] : none reported [Reexperiencing] : reexperiencing [WNL] : within normal limits [FreeTextEntry1] : casually dressed [FreeTextEntry8] : sad, guilty [de-identified] : mood-congruent, anxious, tearful at times [de-identified] : normal rate/rhythm/volume [FreeTextEntry7] : focused on loss of daughter

## 2023-05-17 ENCOUNTER — APPOINTMENT (OUTPATIENT)
Dept: PSYCHIATRY | Facility: CLINIC | Age: 48
End: 2023-05-17
Payer: COMMERCIAL

## 2023-05-17 PROCEDURE — 99214 OFFICE O/P EST MOD 30 MIN: CPT | Mod: 95

## 2023-05-17 NOTE — PLAN
[Rationale for medication choices, possible risks/precautions, benefits, alternative treatment choices, and consequences of non-treatment discussed] : Rationale for medication choices, possible risks/precautions, benefits, alternative treatment choices, and consequences of non-treatment discussed with patient/family/caregiver  [FreeTextEntry5] :  1.Continue Mirtazapine 7.5 mg \par 2.Continue Ramelteon 8 mg qHS (needs to be written at 4 mg or 1/2 a pill qHS for it to be approved)\par 3. Continue Trintellix 20 mg, will consider consolidating medications and completely switching to Mirtazapine in the future once pt is out of the acute phase of therapy treatment and has had sustained improvement of her symptoms

## 2023-05-17 NOTE — PHYSICAL EXAM
[Average] : average [WNL] : within normal limits [Cooperative] : cooperative [Euthymic] : euthymic [Depressed] : depressed [Anxious] : anxious [Full] : full [Clear] : clear

## 2023-05-17 NOTE — RISK ASSESSMENT
[No, patient denies ideation or behavior] : No, patient denies ideation or behavior [FreeTextEntry9] : Risk assessment: Pt has risk factors including recent loss, history of trauma, family history of suicide, history of anxiety and mood disorder however she also has the protective factors of never having attempted suicide, no current active or passive SIIP/HIIP, identifies reasons for living, employed status, supportive family, no access to firearms, no history of self injurious behaviors, and beloved pets. While pt is at chronically elevated risk for self harm, pt is able to care for herself, is not acutely suicidal or homicidal. Pt does not appear to be at imminent risk to self or others at this time and is appropriate for outpt follow up  [Low acute suicide risk] : Low acute suicide risk [No] : No [Not clinically indicated] : Safety Plan completed/updated (for individuals at risk): Not clinically indicated

## 2023-05-17 NOTE — HISTORY OF PRESENT ILLNESS
[FreeTextEntry1] : Pt states that she's doing "ok"\par Mood: "the same"\par Reports that she had a panic attack last week, on Thursday but she was able to work through it and it subsided, She's not sure what the trigger was. She took a Xanax from a previous prescription. \par Pt was able to  her Ramelteon. She states that it was helpful in helping her stay asleep but it made her groggy the next day. She didn't need to take it yesterday. \par She has been trying to take care of herself but sometimes it can be overwhelming for her. \par Patient reflected on some of the hurdles she has been able to overcome, (i.e. was able to drive on the highway 4 times)\par Mother's day was difficult, she didn't visit her mother because this was the location where her daughter completed her attempt. She didn't feel pressure from her mother about this.

## 2023-05-17 NOTE — DISCUSSION/SUMMARY
[FreeTextEntry1] :  Mary Ellen is a 47 year-old partnered female, domiciled with her partner, past psychiatric history of panic disorder, no prior suicide attempts, no history of psychiatric hospitalizations presenting with ptsd, depressive symptoms, anxiety and panic symptoms following the loss of her 22 year-old daughter to suicide in July 2022. Pt is presenting with depressive symptoms including poor sleep, anhedonia and difficulty concentrating. Pt reports that her sleep is not interrupted by nightmares and does not have nightmares. Pt has been having anxiety and panic attacks (3-4 per week). She is being prescribed Trintellix 20 mg but feels that it's not helpful. Given that pt has had a trial of an SSRI which stopped helping her panic attacks and has not had significant improvement with Trintellix, discussed trying a different anti depressant such as mirtazepine to help with her anxiety and depressive symptoms. Discussed risks vs benefits of medication. Pt agreed to trial of mirtazepine. Discussed starting Ramelteon to assist with sleep and pt agreed. \par \par On interview today pt reports that her sleep and and anxiety are improved with 7.5 mg Mirtazepine and 8 mg Ramelteon Denies active or passive SIIP/HIIP.\par  \par Discussed that provider would be leaving clinic in July and pt would be getting a new provider. Began processing termination. Answered questions related to transfer process.

## 2023-05-19 ENCOUNTER — APPOINTMENT (OUTPATIENT)
Dept: PSYCHIATRY | Facility: CLINIC | Age: 48
End: 2023-05-19
Payer: COMMERCIAL

## 2023-05-19 PROCEDURE — 90837 PSYTX W PT 60 MINUTES: CPT | Mod: 95

## 2023-05-19 NOTE — PHYSICAL EXAM
[Well groomed] : well groomed [Average] : average [Cooperative] : cooperative [Depressed] : depressed [Anxious] : anxious [Full] : full [Clear] : clear [Linear/Goal Directed] : linear/goal directed [Preoccupations/Ruminations] : preoccupations/ruminations [None Reported] : none reported [Reexperiencing] : reexperiencing [WNL] : within normal limits [FreeTextEntry1] : casually dressed [FreeTextEntry8] : sad, guilty [de-identified] : mood-congruent, anxious, tearful at times [de-identified] : normal rate/rhythm/volume [FreeTextEntry7] : focused on loss of daughter

## 2023-05-19 NOTE — PLAN
[Prolonged Exposure] : Prolonged Exposure  [Recommended Frequency of Visits: ____] : Recommended frequency of visits: [unfilled] [Return in ____ week(s)] : Return in [unfilled] week(s) [FreeTextEntry2] : \par Problem 1: Re-experiencing sx (i.e., intrusive memories, flashbacks, reactivity to triggers)\par Goal 1: 30% reduction in re-experiencing sx\par \par Problem 2: Negative thoughts and self-blame\par Goal 2: 30% reduction in negative thoughts and self-blame; 30% increase in helpful thinking and peace/acceptance around the loss\par \par Problem 3: Avoidance of locations and situations (e.g., rooms in the apartment, driving over bridges)\par Goal 3: 30% reduction in avoidance of locations and situations\par  [de-identified] : This was a PE intermediate session. Session focused on recent events and discussing next steps in PE. Pt completed the HW to reschedule her psychiatry appointment and get med refills. Pt stated she is back on all medications now, but was overwhelmed to learn that her psychiatrist is leaving. Pt was anxious about it but also relieved that she will continue to see the same provider through her daughter's death anniversary. Pt shared Mother's Day gifts she received from her sister and partner and what they meant to her. Pt also described an experience of being triggered and responding with anger. Writer and pt discussed next steps and agreed to resume imaginal exposures next week. Pt endorsed some stress around this and discussed how she will prepare. For HW this week, pt agreed to conduct in vivo exposure to driving on highways and to try to drive [place] the [place] this time.  [FreeTextEntry1] : Continue weekly individual therapy with this writer, and monthly follow-up with Dr. Palacio for psychopharmacology. \par \par Continue with PE intermediate sessions. Continue to monitor sx and measure progress using self-report measures and HW.

## 2023-05-26 ENCOUNTER — APPOINTMENT (OUTPATIENT)
Dept: PSYCHIATRY | Facility: CLINIC | Age: 48
End: 2023-05-26
Payer: COMMERCIAL

## 2023-05-26 PROCEDURE — 90837 PSYTX W PT 60 MINUTES: CPT | Mod: 95

## 2023-05-26 NOTE — PHYSICAL EXAM
[Well groomed] : well groomed [Average] : average [Cooperative] : cooperative [Depressed] : depressed [Anxious] : anxious [Full] : full [Clear] : clear [Linear/Goal Directed] : linear/goal directed [Preoccupations/Ruminations] : preoccupations/ruminations [None Reported] : none reported [Reexperiencing] : reexperiencing [WNL] : within normal limits [FreeTextEntry1] : casually dressed [FreeTextEntry8] : very sad, guilty [de-identified] : mood-congruent, anxious, very tearful at times, particularly during imaginal exposure.  [de-identified] : normal rate/rhythm/volume [FreeTextEntry7] : focused on loss of daughter

## 2023-05-26 NOTE — PLAN
[Prolonged Exposure] : Prolonged Exposure  [Recommended Frequency of Visits: ____] : Recommended frequency of visits: [unfilled] [Return in ____ week(s)] : Return in [unfilled] week(s) [FreeTextEntry2] : \par Problem 1: Re-experiencing sx (i.e., intrusive memories, flashbacks, reactivity to triggers)\par Goal 1: 30% reduction in re-experiencing sx\par \par Problem 2: Negative thoughts and self-blame\par Goal 2: 30% reduction in negative thoughts and self-blame; 30% increase in helpful thinking and peace/acceptance around the loss\par \par Problem 3: Avoidance of locations and situations (e.g., rooms in the apartment, driving over bridges)\par Goal 3: 30% reduction in avoidance of locations and situations [de-identified] : This was a PE intermediate session. Session focused on conducting third imaginal exposure. Pt reported she was triggered by an insensitive coworker but she received support from her boss. Pt stated she continued to practice highway driving for HW, however she could not manage to drive over a major bridge yet. Pt explained that she was fearful of panic and not ready yet. Writer provided instructions and pt conducted her third imaginal exposure, to the events that happened at home on the day she lost her daughter. Her SUDS before exposure was 6, highest SUDS during exposure was 9, and SUDS after exposure was 10. Writer and pt processed the exposure, noting she was able to share more of her internal experience of the events this time. Writer reinforced the pt's hard work. For HW this week, pt agreed to conduct memory exposure to the new recording at least 1x. [FreeTextEntry1] : Continue weekly individual therapy with this writer, and monthly follow-up with Dr. Palacio for psychopharmacology. \par \par Continue with PE intermediate sessions. Continue to monitor sx and measure progress using self-report measures and HW.

## 2023-06-02 ENCOUNTER — APPOINTMENT (OUTPATIENT)
Dept: PSYCHIATRY | Facility: CLINIC | Age: 48
End: 2023-06-02
Payer: COMMERCIAL

## 2023-06-02 PROCEDURE — 90837 PSYTX W PT 60 MINUTES: CPT | Mod: 95

## 2023-06-02 NOTE — PLAN
[Prolonged Exposure] : Prolonged Exposure  [Recommended Frequency of Visits: ____] : Recommended frequency of visits: [unfilled] [Return in ____ week(s)] : Return in [unfilled] week(s) [FreeTextEntry2] : \par Problem 1: Re-experiencing sx (i.e., intrusive memories, flashbacks, reactivity to triggers)\par Goal 1: 30% reduction in re-experiencing sx\par \par Problem 2: Negative thoughts and self-blame\par Goal 2: 30% reduction in negative thoughts and self-blame; 30% increase in helpful thinking and peace/acceptance around the loss\par \par Problem 3: Avoidance of locations and situations (e.g., rooms in the apartment, driving over bridges)\par Goal 3: 30% reduction in avoidance of locations and situations [de-identified] : This was a PE intermediate session. Session focused on reviewing HW and continuing to process the last imaginal exposure. Pt completed the HW to listen to new memory exposure tape of the first segment of the trauma, the events taking place at home, 2x. Her SUDS ratings were 8 before, 10 during, and 7 after the exposure the first time she listened to the tape, and 8 before, 5 during, and 4 after the exposure the second time she listened to the tape. Pt stated the first time was very hard and she cried a lot; the second time she was not as afraid and knew she could get through it. Pt reflected on the parts of it that upset her the most. Pt also completed another HW spontaneously; she impulsively did an in vivo exposure to her daughter's favorite restaurant. Pt stated she did it for her daughter and her SUDS were 0. For HW this week, pt agreed to conduct memory exposure to the new recording 2x again. [FreeTextEntry1] : Continue weekly individual therapy with this writer, and monthly follow-up with Dr. Palacio for psychopharmacology. \par \par Continue with PE intermediate sessions. Continue to monitor sx and measure progress using self-report measures and HW.

## 2023-06-02 NOTE — PHYSICAL EXAM
[Well groomed] : well groomed [Average] : average [Cooperative] : cooperative [Depressed] : depressed [Anxious] : anxious [Full] : full [Clear] : clear [Linear/Goal Directed] : linear/goal directed [Preoccupations/Ruminations] : preoccupations/ruminations [None Reported] : none reported [Reexperiencing] : reexperiencing [WNL] : within normal limits [FreeTextEntry1] : casually dressed [FreeTextEntry8] : very sad, guilty [de-identified] : mood-congruent, anxious, tearful at times [de-identified] : normal rate/rhythm/volume [FreeTextEntry7] : focused on loss of daughter

## 2023-06-08 ENCOUNTER — APPOINTMENT (OUTPATIENT)
Dept: PSYCHIATRY | Facility: CLINIC | Age: 48
End: 2023-06-08
Payer: COMMERCIAL

## 2023-06-08 PROCEDURE — 99213 OFFICE O/P EST LOW 20 MIN: CPT | Mod: 95

## 2023-06-09 ENCOUNTER — APPOINTMENT (OUTPATIENT)
Dept: PSYCHIATRY | Facility: CLINIC | Age: 48
End: 2023-06-09
Payer: COMMERCIAL

## 2023-06-09 PROCEDURE — 90837 PSYTX W PT 60 MINUTES: CPT | Mod: 95

## 2023-06-09 NOTE — PHYSICAL EXAM
[Well groomed] : well groomed [Average] : average [Cooperative] : cooperative [Depressed] : depressed [Anxious] : anxious [Full] : full [Clear] : clear [Linear/Goal Directed] : linear/goal directed [None Reported] : none reported [Reexperiencing] : reexperiencing [WNL] : within normal limits [None] : none [FreeTextEntry1] : casually dressed [FreeTextEntry8] : sad but more hopeful [de-identified] : mood-congruent, anxious, tearful at times [de-identified] : normal rate/rhythm/volume [FreeTextEntry7] : focused on loss of daughter

## 2023-06-09 NOTE — PLAN
[Prolonged Exposure] : Prolonged Exposure  [Recommended Frequency of Visits: ____] : Recommended frequency of visits: [unfilled] [Return in ____ week(s)] : Return in [unfilled] week(s) [FreeTextEntry2] : \par Problem 1: Re-experiencing sx (i.e., intrusive memories, flashbacks, reactivity to triggers)\par Goal 1: 30% reduction in re-experiencing sx\par \par Problem 2: Negative thoughts and self-blame\par Goal 2: 30% reduction in negative thoughts and self-blame; 30% increase in helpful thinking and peace/acceptance around the loss\par \par Problem 3: Avoidance of locations and situations (e.g., rooms in the apartment, driving over bridges)\par Goal 3: 30% reduction in avoidance of locations and situations\par  [de-identified] : This was a PE intermediate session. Session focused on reviewing HW and reflecting on progress. Pt reported she worked with Dr. Palacio to change medication regimen to address recent panic sx. Pt completed the HW to listen to memory exposure tape of the first segment of the trauma, the events taking place at home, 2x. Her SUDS ratings were 4 before, 7 during, and 9 after the exposure the first time she listened to the tape, and 3 before, 5 during, and 7 after the exposure the second time she listened to the tape. Pt stated she is getting desensitized to the content, and her intrusive memories of daughter's face have decreased. Pt reported she is now able to hold onto happy memories and do things she likes, such as crafts, again. Writer reinforced the pt's hard work to achieve these gains. For HW this week, pt agreed to conduct memory exposure to the new recording 2x again. [FreeTextEntry1] : Continue weekly individual therapy with this writer, and monthly follow-up with Dr. Palacio for psychopharmacology. \par \par Continue with PE intermediate sessions. Continue to monitor sx and measure progress using self-report measures and HW.

## 2023-06-16 ENCOUNTER — APPOINTMENT (OUTPATIENT)
Dept: PSYCHIATRY | Facility: CLINIC | Age: 48
End: 2023-06-16
Payer: COMMERCIAL

## 2023-06-16 PROCEDURE — 90837 PSYTX W PT 60 MINUTES: CPT | Mod: 95

## 2023-06-16 NOTE — PHYSICAL EXAM
[Well groomed] : well groomed [Average] : average [Cooperative] : cooperative [Anxious] : anxious [Full] : full [Clear] : clear [Linear/Goal Directed] : linear/goal directed [None Reported] : none reported [Reexperiencing] : reexperiencing [WNL] : within normal limits [Preoccupations/Ruminations] : preoccupations/ruminations [FreeTextEntry1] : casually dressed [FreeTextEntry8] : sad and guilty but more hopeful [de-identified] : mood-congruent, anxious, tearful at times [de-identified] : normal rate/rhythm/volume [FreeTextEntry7] : focused on loss of daughter

## 2023-06-16 NOTE — PLAN
[Prolonged Exposure] : Prolonged Exposure  [Recommended Frequency of Visits: ____] : Recommended frequency of visits: [unfilled] [Return in ____ week(s)] : Return in [unfilled] week(s) [FreeTextEntry2] : \par Problem 1: Re-experiencing sx (i.e., intrusive memories, flashbacks, reactivity to triggers)\par Goal 1: 30% reduction in re-experiencing sx\par \par Problem 2: Negative thoughts and self-blame\par Goal 2: 30% reduction in negative thoughts and self-blame; 30% increase in helpful thinking and peace/acceptance around the loss\par \par Problem 3: Avoidance of locations and situations (e.g., rooms in the apartment, driving over bridges)\par Goal 3: 30% reduction in avoidance of locations and situations\par  [de-identified] : This was a PE intermediate session. Session focused on reviewing HW and upcoming anniversary. Pt reported she started gardening and is looking for new things that bring her peace. Pt felt her daughter would want this for her. Writer reinforced the pt finding new ways to cope and heal herself. Pt completed the HW to listen to memory exposure tape of the first segment of the trauma 1x, with SUDS ratings of 2 before, 5 during, and 2 after the exposure. Pt stated she feels desensitized to this part of the trauma and is ready to move on to the second part. However for now the pt is focused on preparing for daughter's one-year death anniversary in a few weeks. Pt reported she is holding an event to remember and honor her daughter. Pt stated she will play music at the event. In order to prepare, HW was assigned for the pt to conduct in vivo exposure to listening to music her daughter liked 3-4x.  [FreeTextEntry1] : Continue weekly individual therapy with this writer, and monthly follow-up with Dr. Palacio for psychopharmacology. \par \par Continue with PE intermediate sessions. Continue to monitor sx and measure progress using self-report measures and HW. \par \par Due to writer's planned vacation next week, the next session will be held in 2 weeks.

## 2023-06-22 NOTE — REASON FOR VISIT
[Patient preference] : as per patient preference [Telehealth (audio & video) - Individual/Group] : This visit was provided via telehealth using real-time 2-way audio visual technology. [FreeTextEntry4] : 11:30 AM [FreeTextEntry5] : 12 PM [Patient] : Patient

## 2023-06-22 NOTE — RISK ASSESSMENT
[No, patient denies ideation or behavior] : No, patient denies ideation or behavior [FreeTextEntry8] : Risk assessment: Pt has risk factors including recent loss, history of trauma, family history of suicide, history of anxiety and mood disorder, acute worsening of symptoms, and upcoming anniversary of trauma however she also has the protective factors of never having attempted suicide, no current active or passive SIIP/HIIP, identifies reasons for living, employed status, supportive family, no access to firearms, no history of self injurious behaviors, and beloved pets. While pt is at chronically elevated risk for self harm, pt is able to care for herself, is not acutely suicidal or homicidal. Pt does not appear to be at imminent risk to self or others at this time and is appropriate for outpt follow up \par  [Low acute suicide risk] : Low acute suicide risk [Yes] : Yes [Not clinically indicated] : Safety Plan completed/updated (for individuals at risk): Not clinically indicated

## 2023-06-22 NOTE — DISCUSSION/SUMMARY
[FreeTextEntry1] : Mary Ellen is a 47 year-old partnered female, domiciled with her partner, past psychiatric history of panic disorder, no prior suicide attempts, no history of psychiatric hospitalizations presenting with ptsd, depressive symptoms, anxiety and panic symptoms following the loss of her 22 year-old daughter to suicide in July 2022. Pt is presenting with depressive symptoms including poor sleep, anhedonia and difficulty concentrating. Pt reports that her sleep is not interrupted by nightmares and does not have nightmares. Pt has been having anxiety and panic attacks (3-4 per week). She is being prescribed Trintellix 20 mg but feels that it's not helpful. Given that pt has had a trial of an SSRI which stopped helping her panic attacks and has not had significant improvement with Trintellix, discussed trying a different anti depressant such as mirtazepine to help with her anxiety and depressive symptoms. Discussed risks vs benefits of medication. Pt agreed to trial of mirtazepine. Discussed starting Ramelteon to assist with sleep and pt agreed. \par \par On interview today pt reports an acute worsening of anxiety, panic and insomnia as the anniversary of her daughter's suicide approaches. Discussed meeting more frequently for check ins and support. Also discussed risks vs benefits of increasing Remeron for anxiety and mood. Discussed risks vs benefits of short term benzodiazepine given the time limited nature of the stressor (upcoming anniversary). Pt accepted increased dose of Mirtazepine and Ativan 1 mg BID as needed. Denies active or passive SIIP/HIIP.\par  \par Discussed that provider would be leaving clinic in July and pt would be getting a new provider. Began processing termination. Answered questions related to transfer process.

## 2023-06-22 NOTE — PLAN
[No] : No [Medication education provided] : Medication education provided. [Rationale for medication choices, possible risks/precautions, benefits, alternative treatment choices, and consequences of non-treatment discussed] : Rationale for medication choices, possible risks/precautions, benefits, alternative treatment choices, and consequences of non-treatment discussed with patient/family/caregiver  [FreeTextEntry5] : 1.Increase Mirtazapine to 15 mg \par  2.Continue Ramelteon 8 mg qHS (needs to be written at 4 mg or 1/2 a pill qHS for it to be approved) \par 3. Continue Trintellix 20 mg, will consider consolidating medications and completely switching to Mirtazapine in the future once pt is out of the acute phase of therapy treatment and has had sustained improvement of her symptoms \par 4. Initiate Lorazepam 1 mg BID PRN for severe anxiety (ordered through [facility])\par 5. RTC in 3 weeks

## 2023-06-22 NOTE — PHYSICAL EXAM
[Average] : average [Cooperative] : cooperative [Depressed] : depressed [Anxious] : anxious [Constricted] : constricted [Clear] : clear [Linear/Goal Directed] : linear/goal directed [WNL] : within normal limits

## 2023-06-28 ENCOUNTER — APPOINTMENT (OUTPATIENT)
Dept: PSYCHIATRY | Facility: CLINIC | Age: 48
End: 2023-06-28

## 2023-06-30 ENCOUNTER — APPOINTMENT (OUTPATIENT)
Dept: PSYCHIATRY | Facility: CLINIC | Age: 48
End: 2023-06-30
Payer: COMMERCIAL

## 2023-06-30 PROCEDURE — 90837 PSYTX W PT 60 MINUTES: CPT | Mod: 95

## 2023-06-30 NOTE — PLAN
[Prolonged Exposure] : Prolonged Exposure  [Recommended Frequency of Visits: ____] : Recommended frequency of visits: [unfilled] [Return in ____ week(s)] : Return in [unfilled] week(s) [FreeTextEntry2] : \par Problem 1: Re-experiencing sx (i.e., intrusive memories, flashbacks, reactivity to triggers)\par Goal 1: 30% reduction in re-experiencing sx\par \par Problem 2: Negative thoughts and self-blame\par Goal 2: 30% reduction in negative thoughts and self-blame; 30% increase in helpful thinking and peace/acceptance around the loss\par \par Problem 3: Avoidance of locations and situations (e.g., rooms in the apartment, driving over bridges)\par Goal 3: 30% reduction in avoidance of locations and situations\par  [de-identified] : This was a PE intermediate session. Session focused on upcoming anniversary and reviewing HW. Pt reported she has been busy preparing to hold a celebration of life event on her daughter's one-year death anniversary. Pt stated she may be engaging in some avoidance as she prepares for this. Pt discussed what the event will look like and what it means to her. Pt completed the HW to conduct in vivo exposure to listening to music her daughter liked. She did the assignment 7x by listening to 7 different songs, with high SUDS ratings of mostly 8-10. Pt recognized she did not listen for the recommended duration and she made plan to do so by compiling a playlist of her late daughter's favorite songs. HW was assigned for the pt to continue in vivo exposures to music her daughter liked with modifications. Pt will also listen to this music at the one-year anniversary event for her late daughter.  [FreeTextEntry1] : Continue weekly individual therapy with this writer, and monthly follow-up with Dr. Palacio for psychopharmacology. \par \par Continue with PE intermediate sessions. Continue to monitor sx and measure progress using self-report measures and HW.

## 2023-06-30 NOTE — PHYSICAL EXAM
[Well groomed] : well groomed [Average] : average [Cooperative] : cooperative [Anxious] : anxious [Full] : full [Clear] : clear [Linear/Goal Directed] : linear/goal directed [None] : none [None Reported] : none reported [Reexperiencing] : reexperiencing [WNL] : within normal limits [Depressed] : depressed [FreeTextEntry1] : casually dressed [FreeTextEntry8] : very sad but motivated for recovery [de-identified] : mood-congruent, anxious, very tearful at times [de-identified] : normal rate/rhythm/volume [FreeTextEntry7] : focused on loss of daughter and upcoming anniversary

## 2023-07-06 ENCOUNTER — APPOINTMENT (OUTPATIENT)
Dept: PSYCHIATRY | Facility: CLINIC | Age: 48
End: 2023-07-06
Payer: COMMERCIAL

## 2023-07-06 PROCEDURE — 99214 OFFICE O/P EST MOD 30 MIN: CPT | Mod: 95

## 2023-07-06 NOTE — PLAN
[No] : No [FreeTextEntry5] : 1.Continue Mirtazapine to 15 mg \par 2.Continue Ramelteon 8 mg qHS (needs to be written at 4 mg or 1/2 a pill qHS for it to be approved)\par 3. Continue Trintellix 20 mg, will consider consolidating medications and completely switching to Mirtazapine in the future once pt is out of the acute phase of therapy treatment and has had sustained improvement of her symptoms \par 4.Continue Lorazepam 1 mg daily PRN for severe anxiety\par \par -all refills were sent through [facility]

## 2023-07-06 NOTE — HISTORY OF PRESENT ILLNESS
[FreeTextEntry1] : Pt described that her neighbor's son would frequently become agitated and would yell. He would also call the police multiple times per day on his mother, making false accusations against his mother and would rope the pt into these accusations. The  awarded  the pt's mother an order of protection and there was a motion for neighbor's son to vacate. Pt states that she's been experiencing his behavior since March but the past few months, his behavior worsened and it was impacting the pt's work, therapy, and healing due to increased anxiety, panic and the impact on her mental health. \par \par The pt was able to have the memorial for her daughter still. Her friends and her daughter's friends came. She was tearful at times but was preoccupied with bringing food out and hosting and she also had her friends for support which was helpful. \par \par Some of  the more Buddhist attendees made statements such as 'you need to get something good from this and maybe you should speak to children at the local HS' or 'you need to work on your relationship with God' or 'you should write a book'. Pt felt that it was a gut punch but was able to calmly explain that she's on her own journey and she's not in a place to engage in what they're discussing. \par \par Pt feels that overall the lorazepam has been very helpful for anxiety and panic. She states that she's taken it 5-7 times.

## 2023-07-06 NOTE — PHYSICAL EXAM
[Cooperative] : cooperative [Full] : full [Clear] : clear [WNL] : within normal limits [FreeTextEntry8] : sad

## 2023-07-07 ENCOUNTER — APPOINTMENT (OUTPATIENT)
Dept: PSYCHIATRY | Facility: CLINIC | Age: 48
End: 2023-07-07
Payer: COMMERCIAL

## 2023-07-07 PROCEDURE — 90837 PSYTX W PT 60 MINUTES: CPT | Mod: 95

## 2023-07-08 NOTE — REASON FOR VISIT
[Other Location: e.g. Home (Enter Location, City,State)___] : at [unfilled] [Home] : at home, [unfilled] , at the time of the visit. [Patient] : Patient [FreeTextEntry1] : "Trauma and grief"

## 2023-07-08 NOTE — PHYSICAL EXAM
[Well groomed] : well groomed [Average] : average [Cooperative] : cooperative [Depressed] : depressed [Anxious] : anxious [Full] : full [Clear] : clear [Linear/Goal Directed] : linear/goal directed [None] : none [None Reported] : none reported [Reexperiencing] : reexperiencing [WNL] : within normal limits [FreeTextEntry1] : casually dressed [FreeTextEntry8] : sad but motivated for recovery [de-identified] : mood-congruent, anxious, tearful at times [de-identified] : normal rate/rhythm/volume [FreeTextEntry7] : focused on loss of daughter and recent anniversary

## 2023-07-08 NOTE — PLAN
[Prolonged Exposure] : Prolonged Exposure  [Recommended Frequency of Visits: ____] : Recommended frequency of visits: [unfilled] [Return in ____ week(s)] : Return in [unfilled] week(s) [FreeTextEntry2] : \par Problem 1: Re-experiencing sx (i.e., intrusive memories, flashbacks, reactivity to triggers)\par Goal 1: 30% reduction in re-experiencing sx\par \par Problem 2: Negative thoughts and self-blame\par Goal 2: 30% reduction in negative thoughts and self-blame; 30% increase in helpful thinking and peace/acceptance around the loss\par \par Problem 3: Avoidance of locations and situations (e.g., rooms in the apartment, driving over bridges)\par Goal 3: 30% reduction in avoidance of locations and situations\par  [de-identified] : This was a PE intermediate session. Session focused on recent anniversary and reviewing HW. Pt reported she held a memorial event for the anniversary of her daughter's passing and it went well. Pt stated some attendees preached Spiritism to her but she set limits on this appropriately. After the main event, a smaller group of loved ones stayed and continued to remember and honor her late daughter. Pt was overall pleased with the event and glad she did it. Pt completed the HW to conduct in vivo exposure to music her daughter liked. She did the assignment 3x by listening to playlists she made of her late daughter's favorite songs. Pt's SUDS ratings were high (mostly 7-10), however she reported she was able to recover well afterward (SUDS of 5). Writer reinforced the pt facing this challenging exposure. HW was assigned for the pt to continue in vivo exposures to music her daughter liked.  [FreeTextEntry1] : Continue weekly individual therapy with this writer, and monthly follow-up for psychopharmacology. Pt will be assigned a new psychiatrist. \par \par Continue with PE intermediate sessions. Continue to monitor sx and measure progress using self-report measures and HW.

## 2023-07-13 ENCOUNTER — APPOINTMENT (OUTPATIENT)
Dept: PSYCHIATRY | Facility: CLINIC | Age: 48
End: 2023-07-13
Payer: COMMERCIAL

## 2023-07-13 PROCEDURE — 90837 PSYTX W PT 60 MINUTES: CPT | Mod: 95

## 2023-07-13 NOTE — PLAN
[Prolonged Exposure] : Prolonged Exposure  [Recommended Frequency of Visits: ____] : Recommended frequency of visits: [unfilled] [Return in ____ week(s)] : Return in [unfilled] week(s) [FreeTextEntry2] : \par Problem 1: Re-experiencing sx (i.e., intrusive memories, flashbacks, reactivity to triggers)\par Goal 1: 30% reduction in re-experiencing sx\par \par Problem 2: Negative thoughts and self-blame\par Goal 2: 30% reduction in negative thoughts and self-blame; 30% increase in helpful thinking and peace/acceptance around the loss\par \par Problem 3: Avoidance of locations and situations (e.g., rooms in the apartment, driving over bridges)\par Goal 3: 30% reduction in avoidance of locations and situations\par  [de-identified] : This was a PE intermediate session. Session focused on reviewing HW and progress. Pt completed the HW to conduct in vivo exposure to music, however this time she listened to music on the radio, not music her daughter specifically liked. Her SUDS ratings were lower, however when she did hear one of her daughter's favorite songs, her SUDS went up to a 9. Writer and pt reflected on how far she has come as she used to feel afraid of music; it was a trigger and now it is more of a reminder and connection to grief around the loss. Pt expressed starting to feel more like herself again in some ways, wanting to start reading again. Writer and pt discussed and made plan to conduct imaginal exposure during next session, to the second half of her trauma narrative taking place at the hospital. HW was assigned for the pt to continue in vivo exposures to music her daughter liked, at least 2x this week. [FreeTextEntry1] : Continue weekly individual therapy with this writer, and monthly follow-up for psychopharmacology. Pt will be assigned a new psychiatrist. \par \par Continue with PE intermediate sessions. Continue to monitor sx and measure progress using self-report measures and HW.

## 2023-07-13 NOTE — PHYSICAL EXAM
[Well groomed] : well groomed [Average] : average [Cooperative] : cooperative [Anxious] : anxious [Full] : full [Clear] : clear [Linear/Goal Directed] : linear/goal directed [None] : none [None Reported] : none reported [Reexperiencing] : reexperiencing [WNL] : within normal limits [FreeTextEntry1] : casually dressed [FreeTextEntry8] : sad but motivated for recovery [de-identified] : mood-congruent, anxious, tearful at times [de-identified] : normal rate/rhythm/volume [FreeTextEntry7] : focused on loss and experiences of trauma and grief

## 2023-07-21 ENCOUNTER — APPOINTMENT (OUTPATIENT)
Dept: PSYCHIATRY | Facility: CLINIC | Age: 48
End: 2023-07-21
Payer: COMMERCIAL

## 2023-07-21 PROCEDURE — 90837 PSYTX W PT 60 MINUTES: CPT | Mod: 95

## 2023-07-21 NOTE — PLAN
[Psychoeducation] : Psychoeducation  [Recommended Frequency of Visits: ____] : Recommended frequency of visits: [unfilled] [Return in ____ week(s)] : Return in [unfilled] week(s) [FreeTextEntry2] : \par Problem 1: Re-experiencing sx (i.e., intrusive memories, flashbacks, reactivity to triggers)\par Goal 1: 30% reduction in re-experiencing sx\par \par Problem 2: Negative thoughts and self-blame\par Goal 2: 30% reduction in negative thoughts and self-blame; 30% increase in helpful thinking and peace/acceptance around the loss\par \par Problem 3: Avoidance of locations and situations (e.g., rooms in the apartment, driving over bridges)\par Goal 3: 30% reduction in avoidance of locations and situations\par  [de-identified] : Today's session was not a PE session as the pt was struggling and needed more general psychoeducation and support. Pt reported she had a rough week with a resurgence of PTSD sx and she did not do the HW to conduct in vivo exposures to music. Pt also felt she could not properly conduct an imaginal exposure today. Session focused on the pt's new sx and experiences. Pt reported she had new dreams about her late daughter that were traumatic and upsetting. Pt described images of her daughter's lifeless face and her body being cremated. Pt stated she feels discouraged as she had been feeling better. Writer normalized these experiences and reminded the pt that recovery is not linear. Writer and pt discussed what these sx may mean and by the end of session, the pt stated she felt reassured and less down about it. HW was assigned to resume in vivo exposures to music this week.  [FreeTextEntry1] : Continue weekly individual therapy with this writer, and monthly follow-up for psychopharmacology. Pt will be assigned a new psychiatrist. \par \par Continue with PE intermediate sessions. Continue to monitor sx and measure progress using self-report measures and HW.

## 2023-07-21 NOTE — PHYSICAL EXAM
[Well groomed] : well groomed [Average] : average [Cooperative] : cooperative [Depressed] : depressed [Anxious] : anxious [Full] : full [Clear] : clear [Linear/Goal Directed] : linear/goal directed [None] : none [None Reported] : none reported [Reexperiencing] : reexperiencing [WNL] : within normal limits [FreeTextEntry1] : casually dressed [FreeTextEntry8] : sad, discouraged [de-identified] : mood-congruent, anxious, tearful at times [de-identified] : normal rate/rhythm/volume [FreeTextEntry7] : focused on loss and recent PTSD sx

## 2023-07-28 ENCOUNTER — APPOINTMENT (OUTPATIENT)
Dept: PSYCHIATRY | Facility: CLINIC | Age: 48
End: 2023-07-28
Payer: COMMERCIAL

## 2023-07-28 PROCEDURE — 90837 PSYTX W PT 60 MINUTES: CPT | Mod: 95

## 2023-07-28 NOTE — PLAN
[Prolonged Exposure] : Prolonged Exposure  [Recommended Frequency of Visits: ____] : Recommended frequency of visits: [unfilled] [Return in ____ week(s)] : Return in [unfilled] week(s) [FreeTextEntry2] : \par Problem 1: Re-experiencing sx (i.e., intrusive memories, flashbacks, reactivity to triggers)\par Goal 1: 30% reduction in re-experiencing sx\par \par Problem 2: Negative thoughts and self-blame\par Goal 2: 30% reduction in negative thoughts and self-blame; 30% increase in helpful thinking and peace/acceptance around the loss\par \par Problem 3: Avoidance of locations and situations (e.g., rooms in the apartment, driving over bridges)\par Goal 3: 30% reduction in avoidance of locations and situations\par  [de-identified] : This was a PE intermediate session. Session focused on reviewing HW and recent birthday. Pt reported she no longer had the disturbing re-experiencing sx she was experiencing last week. This allowed her to have a peaceful birthday filled with loving gestures from her family, friends, and even her daughter's friends. Pt was grateful for this. Pt completed the HW to conduct in vivo exposure to music her daughter liked. She did the assignment 1x by listening to playlist of her late daughter's favorite songs. Pt's SUDS ratings were 7 before, 9 during, and 3 after the exposure. Writer and pt noted how she is now able to recover from listening to music, and she is no longer triggered or afraid to hear music in public. Writer and pt also continued to discuss plan for next imaginal exposure and how she can prepare. HW was assigned to continue in vivo exposures to music until the next session.  [FreeTextEntry1] : Continue weekly individual therapy with this writer, and monthly follow-up for psychopharmacology. Pt is scheduled to meet with her new psychiatrist, Dr. Adam Gipson, on 8/10.  \par \par Continue with PE intermediate sessions. Continue to monitor sx and measure progress using self-report measures and HW. \par \par Due to writer's planned vacation next week, the next session will be held in 2 weeks.

## 2023-07-28 NOTE — PHYSICAL EXAM
[Well groomed] : well groomed [Average] : average [Cooperative] : cooperative [Depressed] : depressed [Anxious] : anxious [Full] : full [Clear] : clear [Linear/Goal Directed] : linear/goal directed [None] : none [None Reported] : none reported [Reexperiencing] : reexperiencing [WNL] : within normal limits [FreeTextEntry1] : casually dressed [FreeTextEntry8] : sad but more at peace [de-identified] : mood-congruent, anxious, tearful at times [de-identified] : normal rate/rhythm/volume [FreeTextEntry7] : focused on loss of daughter and recent birthday

## 2023-08-10 ENCOUNTER — APPOINTMENT (OUTPATIENT)
Dept: PSYCHIATRY | Facility: CLINIC | Age: 48
End: 2023-08-10
Payer: COMMERCIAL

## 2023-08-10 DIAGNOSIS — Z81.8 FAMILY HISTORY OF OTHER MENTAL AND BEHAVIORAL DISORDERS: ICD-10-CM

## 2023-08-10 DIAGNOSIS — F17.200 NICOTINE DEPENDENCE, UNSPECIFIED, UNCOMPLICATED: ICD-10-CM

## 2023-08-10 PROCEDURE — 99214 OFFICE O/P EST MOD 30 MIN: CPT | Mod: 95

## 2023-08-10 RX ORDER — MIRTAZAPINE 7.5 MG/1
7.5 TABLET, FILM COATED ORAL
Qty: 30 | Refills: 0 | Status: DISCONTINUED | COMMUNITY
Start: 2023-02-15 | End: 2023-08-10

## 2023-08-10 RX ORDER — RAMELTEON 8 MG/1
8 TABLET ORAL AT BEDTIME
Qty: 15 | Refills: 0 | Status: DISCONTINUED | COMMUNITY
Start: 2023-02-15 | End: 2023-08-10

## 2023-08-10 NOTE — DISCUSSION/SUMMARY
[FreeTextEntry1] : provided emotional support medication reconciled to continue trauma therapy  f/u in 1 month

## 2023-08-10 NOTE — HISTORY OF PRESENT ILLNESS
[FreeTextEntry1] : discussed her daughter Brielle's suicide in July 2022 patient found her after overdose from Benadryl ( which was hers) in bathroom she regrets not seeking emergency help and she thought daughter was "drunk" and needed to sleep 3 hrs later was found dead from presumed cardiac arrest Daughter was 22 at time of death Struggled with depression and anxiety for years patient has regret and remorse for not "doing enough" to help her. [FreeTextEntry2] : h/o anxiety and panic attacks  PCP prescribed Xanax

## 2023-08-10 NOTE — REASON FOR VISIT
[Patient preference] : as per patient preference [Access issues (e.g., transportation, impaired mobility, etc.)] : due to patient's access issues [Medical Office: ([facility])___] : The provider was located at the medical office in [unfilled]. [Home] : The patient, [unfilled], was located at home, [unfilled], at the time of the visit. [Verbal consent obtained from patient/other participant(s)] : Verbal consent for telehealth/telephonic services obtained from patient/other participant(s) [FreeTextEntry4] : 3821 [Patient] : Patient [FreeTextEntry1] : follow up from Dr Kelly for med managment

## 2023-08-10 NOTE — PLAN
[No] : No [Medication education provided] : Medication education provided. [Rationale for medication choices, possible risks/precautions, benefits, alternative treatment choices, and consequences of non-treatment discussed] : Rationale for medication choices, possible risks/precautions, benefits, alternative treatment choices, and consequences of non-treatment discussed with patient/family/caregiver  [Order(s) for medication placed in [facility] EHR] : Medication

## 2023-08-10 NOTE — PHYSICAL EXAM
[None] : none [Cooperative] : cooperative [Depressed] : depressed [Anxious] : anxious [Constricted] : constricted [Clear] : clear [Linear/Goal Directed] : linear/goal directed [Preoccupations/Ruminations] : preoccupations/ruminations [Depressive] : depressive [Self-Deprecatory] : self-deprecatory [Average] : average [WNL] : within normal limits [Not applicable] : not applicable

## 2023-08-11 ENCOUNTER — APPOINTMENT (OUTPATIENT)
Dept: PSYCHIATRY | Facility: CLINIC | Age: 48
End: 2023-08-11
Payer: COMMERCIAL

## 2023-08-11 PROCEDURE — 90837 PSYTX W PT 60 MINUTES: CPT | Mod: 95

## 2023-08-12 NOTE — PLAN
[Prolonged Exposure] : Prolonged Exposure  [Recommended Frequency of Visits: ____] : Recommended frequency of visits: [unfilled] [Return in ____ week(s)] : Return in [unfilled] week(s) [FreeTextEntry2] : Problem 1: Re-experiencing sx (i.e., intrusive memories, flashbacks, reactivity to triggers) Goal 1: 30% reduction in re-experiencing sx  Problem 2: Negative thoughts and self-blame Goal 2: 30% reduction in negative thoughts and self-blame; 30% increase in helpful thinking and peace/acceptance around the loss  Problem 3: Avoidance of locations and situations (e.g., rooms in the apartment, driving over bridges) Goal 3: 30% reduction in avoidance of locations and situations  [de-identified] : This was a PE intermediate session. Session focused on reviewing HW and recent events. Pt reported she has had intense grief and intrusive memories of her daughter's death. Pt stated she is distracting herself with work, but is otherwise less motivated, has fallen back into pattern of not showering. Pt reported she was still able to motivate to bake a birthday cake for her partner. Pt completed the HW to conduct in vivo exposure to music her daughter liked 2x. Her SUDS ratings went down to 1-2 before and after exposure, with highest SUDS of 5-7 during exposure. Pt felt this exposure is only eliciting grief and not trauma anymore. Given pt's increased ability to tolerate music, decision was made to stop assigning this for HW. Writer and pt made plan to conduct imaginal exposure during next session. HW was assigned for the pt to take a shower every other day until the next session. [FreeTextEntry1] : Continue weekly individual therapy with this writer, and monthly follow-up with Dr. Gipson for psychopharmacology.   Continue with PE intermediate sessions. Continue to monitor sx and measure progress using self-report measures and HW.

## 2023-08-12 NOTE — RISK ASSESSMENT
[Yes] : Yes [No] : No [FreeTextEntry8] : Pt endorsed having some recent SI ("What if I just drove into that wall?"; "Maybe I should swallow the pills in that bottle"). Pt stated she has no intention of acting on these thoughts and that she fears death too much to ever take her own life. Pt was future-oriented to continuing her tx and recovery, with hope that she will feel differently in the future.

## 2023-08-12 NOTE — PHYSICAL EXAM
[Well groomed] : well groomed [Average] : average [Cooperative] : cooperative [Depressed] : depressed [Anxious] : anxious [Full] : full [Clear] : clear [Linear/Goal Directed] : linear/goal directed [None] : none [None Reported] : none reported [Reexperiencing] : reexperiencing [WNL] : within normal limits [FreeTextEntry1] : casually dressed [FreeTextEntry8] : very sad, guilty [de-identified] : mood-congruent, anxious, slightly tearful at times [de-identified] : normal rate/rhythm/volume [FreeTextEntry7] : focused on loss of daughter and recent events

## 2023-08-18 ENCOUNTER — APPOINTMENT (OUTPATIENT)
Dept: PSYCHIATRY | Facility: CLINIC | Age: 48
End: 2023-08-18
Payer: COMMERCIAL

## 2023-08-18 PROCEDURE — 90837 PSYTX W PT 60 MINUTES: CPT | Mod: 95

## 2023-08-18 NOTE — RISK ASSESSMENT
[Yes] : Yes [No] : No [FreeTextEntry8] : Pt endorsed recent passive SI ("I wish I would die in my sleep"). Pt stated she has no intention of harming herself and fears death too much to ever take her own life. Pt was future-oriented to continuing her tx and recovery.

## 2023-08-18 NOTE — PHYSICAL EXAM
[Well groomed] : well groomed [Average] : average [Cooperative] : cooperative [Depressed] : depressed [Anxious] : anxious [Full] : full [Clear] : clear [Linear/Goal Directed] : linear/goal directed [Preoccupations/Ruminations] : preoccupations/ruminations [None Reported] : none reported [Reexperiencing] : reexperiencing [WNL] : within normal limits [FreeTextEntry1] : casually dressed [FreeTextEntry8] : very sad, guilty [de-identified] : mood-congruent, anxious, very tearful at times, particularly during imaginal exposure.  [de-identified] : normal rate/rhythm/volume [FreeTextEntry7] : focused on loss of daughter

## 2023-08-18 NOTE — PLAN
[Prolonged Exposure] : Prolonged Exposure  [Recommended Frequency of Visits: ____] : Recommended frequency of visits: [unfilled] [Return in ____ week(s)] : Return in [unfilled] week(s) [FreeTextEntry2] : Problem 1: Re-experiencing sx (i.e., intrusive memories, flashbacks, reactivity to triggers) Goal 1: 30% reduction in re-experiencing sx  Problem 2: Negative thoughts and self-blame Goal 2: 30% reduction in negative thoughts and self-blame; 30% increase in helpful thinking and peace/acceptance around the loss  Problem 3: Avoidance of locations and situations (e.g., rooms in the apartment, driving over bridges) Goal 3: 30% reduction in avoidance of locations and situations [de-identified] : This was a PE intermediate session. Session focused on conducting imaginal exposure. Pt reported she has been suffering with profound grief and feels pressure to recover more quickly. Writer normalized a gradual recovery from such a traumatic loss. Pt reported she completed the HW to some extent; she took 2 showers but did not wash her hair. Pt stated it is hard to motivate to take care of herself, but she got her nails done. Writer reinforced the pt's efforts. Writer provided instructions and pt conducted a new imaginal exposure, to the events that happened at the hospital on the day she lost her daughter. Her SUDS before exposure was 3, highest SUDS during exposure was 10, and SUDS after exposure was 6. Writer and pt processed the exposure, noting she was very emotionally connected to the experience. Writer reinforced the pt's hard work. For HW this week, pt agreed to conduct memory exposure to the new recording 1-2x.   [FreeTextEntry1] : Continue weekly individual therapy with this writer, and monthly follow-up with Dr. Gipson for psychopharmacology.   Continue with PE intermediate sessions. Continue to monitor sx and measure progress using self-report measures and HW.

## 2023-08-25 ENCOUNTER — APPOINTMENT (OUTPATIENT)
Dept: PSYCHIATRY | Facility: CLINIC | Age: 48
End: 2023-08-25
Payer: COMMERCIAL

## 2023-08-25 PROCEDURE — 90837 PSYTX W PT 60 MINUTES: CPT | Mod: 95

## 2023-08-25 NOTE — RISK ASSESSMENT
[Yes] : Yes [No] : No [FreeTextEntry8] : Pt endorsed recent passive SI ("I don't wanna be here without my daughter"; "I don't wanna live my life without my child"). Pt denied any active SIIP. Pt was future-oriented to continuing her tx and recovery. Pt also stated if her SI ever escalates, she would call this writer, seek the support of her partner, roommate, or family, and/or seek emergency services.

## 2023-08-25 NOTE — PHYSICAL EXAM
[Well groomed] : well groomed [Average] : average [Cooperative] : cooperative [Depressed] : depressed [Anxious] : anxious [Full] : full [Clear] : clear [Linear/Goal Directed] : linear/goal directed [None] : none [None Reported] : none reported [Reexperiencing] : reexperiencing [WNL] : within normal limits [FreeTextEntry1] : casually dressed [FreeTextEntry8] : very sad, lonely [de-identified] : mood-congruent, anxious, tearful at times [de-identified] : normal rate/rhythm/volume [FreeTextEntry7] : focused on loss of daughter

## 2023-08-25 NOTE — PLAN
[Prolonged Exposure] : Prolonged Exposure  [Recommended Frequency of Visits: ____] : Recommended frequency of visits: [unfilled] [Return in ____ week(s)] : Return in [unfilled] week(s) [FreeTextEntry2] : Problem 1: Re-experiencing sx (i.e., intrusive memories, flashbacks, reactivity to triggers) Goal 1: 30% reduction in re-experiencing sx  Problem 2: Negative thoughts and self-blame Goal 2: 30% reduction in negative thoughts and self-blame; 30% increase in helpful thinking and peace/acceptance around the loss  Problem 3: Avoidance of locations and situations (e.g., rooms in the apartment, driving over bridges) Goal 3: 30% reduction in avoidance of locations and situations [de-identified] : This was a PE intermediate session. Session focused on reviewing HW and continuing to process the trauma of losing her daughter. Pt discussed plans for late daughter's birthday coming up, as well as distraction and self-care activities. Pt completed the HW to listen to new memory exposure tape of the second segment of the trauma, the events that happened at the hospital on the day she lost her daughter. Pt listened to the tape 2x, and SUDS before exposure were 1-3, peak SUDS during exposure were 8-9, and SUDS after exposure were 1. Writer and pt continued to process the memory, and she noted it was hard to hear herself in so much pain on learning of her daughter's death. Pt also described a new part of the memory that came back to her as she was listening. Writer reinforced the pt's hard work in making this recording and completing this assignment. For HW this week, pt agreed to conduct memory exposure to the new recording 2x again.  [FreeTextEntry1] : Continue weekly individual therapy with this writer, and monthly follow-up with Dr. Gipson for psychopharmacology.   Continue with PE intermediate sessions. Continue to monitor sx and measure progress using self-report measures and HW.

## 2023-09-01 ENCOUNTER — APPOINTMENT (OUTPATIENT)
Dept: PSYCHIATRY | Facility: CLINIC | Age: 48
End: 2023-09-01
Payer: COMMERCIAL

## 2023-09-01 PROCEDURE — 90837 PSYTX W PT 60 MINUTES: CPT | Mod: 95

## 2023-09-01 NOTE — RISK ASSESSMENT
[Yes] : Yes [No] : No [FreeTextEntry8] : Pt did not endorse current SIIP. Pt was future-oriented to future plans with family and career, and to continuing her tx and recovery.

## 2023-09-01 NOTE — PHYSICAL EXAM
[Well groomed] : well groomed [Average] : average [Cooperative] : cooperative [Depressed] : depressed [Anxious] : anxious [Angry] : angry [Full] : full [Clear] : clear [Linear/Goal Directed] : linear/goal directed [None] : none [None Reported] : none reported [Reexperiencing] : reexperiencing [WNL] : within normal limits [FreeTextEntry1] : casually dressed [FreeTextEntry8] : very sad, guilty, scared [de-identified] : mood-congruent, anxious, tearful at times [de-identified] : normal rate/rhythm/volume [FreeTextEntry7] : focused on loss of daughter

## 2023-09-01 NOTE — PLAN
[Prolonged Exposure] : Prolonged Exposure  [Recommended Frequency of Visits: ____] : Recommended frequency of visits: [unfilled] [Return in ____ week(s)] : Return in [unfilled] week(s) [FreeTextEntry2] : Problem 1: Re-experiencing sx (i.e., intrusive memories, flashbacks, reactivity to triggers) Goal 1: 30% reduction in re-experiencing sx  Problem 2: Negative thoughts and self-blame Goal 2: 30% reduction in negative thoughts and self-blame; 30% increase in helpful thinking and peace/acceptance around the loss  Problem 3: Avoidance of locations and situations (e.g., rooms in the apartment, driving over bridges) Goal 3: 30% reduction in avoidance of locations and situations [de-identified] : This was a PE intermediate session. Session focused on reviewing HW and continuing to process the trauma of losing her daughter. Pt reported she has been up and down with different feelings, including anger, sadness, loss, and fear. Pt endorsed traumatic, intrusive visions of her late daughter as well, such as of her body being cremated. Pt expressed pain and hurt, and she also recognized that recovery is not linear. Pt completed the HW to listen to memory exposure tape of the second segment of the trauma, the events that happened at the hospital on the day she lost her daughter. Pt listened to the tape 2x. Her SUDS before, during, and after exposure were 1, 8, and 2, respectively, the first time she listened, and 5, 10, and 7, respectively, the second time. Pt explained that she was having a harder time with late daughter's birthday approaching the second time. Writer reinforced her effort and commitment to doing this HW at a difficult time. Pt shared that family plans to cook her late daughter's favorite foods and eat together on her birthday. For HW this week, pt agreed to conduct memory exposure 1x after getting through her late daughter's birthday first.  [FreeTextEntry1] : Continue weekly individual therapy with this writer, and monthly follow-up with Dr. Gipson for psychopharmacology.   Continue with PE intermediate sessions. Continue to monitor sx and measure progress using self-report measures and HW.

## 2023-09-08 ENCOUNTER — APPOINTMENT (OUTPATIENT)
Dept: PSYCHIATRY | Facility: CLINIC | Age: 48
End: 2023-09-08
Payer: COMMERCIAL

## 2023-09-08 PROCEDURE — 90837 PSYTX W PT 60 MINUTES: CPT | Mod: 95

## 2023-09-09 NOTE — PHYSICAL EXAM
[Well groomed] : well groomed [Average] : average [Cooperative] : cooperative [Depressed] : depressed [Anxious] : anxious [Full] : full [Clear] : clear [Linear/Goal Directed] : linear/goal directed [None] : none [None Reported] : none reported [Reexperiencing] : reexperiencing [WNL] : within normal limits [FreeTextEntry1] : casually dressed [FreeTextEntry8] : very sad [de-identified] : mood-congruent, anxious, very tearful at times [de-identified] : normal rate/rhythm/volume [FreeTextEntry7] : focused on loss of daughter

## 2023-09-09 NOTE — RISK ASSESSMENT
[Yes] : Yes [No] : No [FreeTextEntry8] : Pt did not endorse current SIIP. Pt was future-oriented to continuing her tx and recovery.

## 2023-09-09 NOTE — PLAN
[Prolonged Exposure] : Prolonged Exposure  [Recommended Frequency of Visits: ____] : Recommended frequency of visits: [unfilled] [Return in ____ week(s)] : Return in [unfilled] week(s) [FreeTextEntry2] : Problem 1: Re-experiencing sx (i.e., intrusive memories, flashbacks, reactivity to triggers) Goal 1: 30% reduction in re-experiencing sx  Problem 2: Negative thoughts and self-blame Goal 2: 30% reduction in negative thoughts and self-blame; 30% increase in helpful thinking and peace/acceptance around the loss  Problem 3: Avoidance of locations and situations (e.g., rooms in the apartment, driving over bridges) Goal 3: 30% reduction in avoidance of locations and situations [de-identified] : This was a PE intermediate session. Session focused on reviewing HW and continuing to process the trauma of losing her daughter. Pt reported her late daughter's birthday was a very hard day. Pt completed the HW to listen to memory exposure tape of the second segment of the trauma, the events that happened at the hospital on the day she lost her daughter. Pt listened to the tape 1x, and her SUDS before, during, and after exposure were 3, 9, and 1, respectively. This time while listening, the pt recalled a new memory of herself falling to the floor in the hospital when she learned her daughter had passed. During this session, writer informed the pt of her upcoming maternity leave. Pt became emotional and expressed happiness for this writer. Pt's feelings about this news were processed. Writer and pt agreed to complete PE before termination, and then consider a referral for grief tx. For HW, pt agreed to conduct the same memory exposure 3x prior to the next session.  [FreeTextEntry1] : Continue weekly individual therapy with this writer, and monthly follow-up with Dr. Gipson for psychopharmacology.   Continue with PE intermediate sessions. Continue to monitor sx and measure progress using self-report measures and HW.   Due to writer's planned vacation next week, the next session will be held in 2 weeks.

## 2023-09-21 ENCOUNTER — APPOINTMENT (OUTPATIENT)
Dept: PSYCHIATRY | Facility: CLINIC | Age: 48
End: 2023-09-21
Payer: COMMERCIAL

## 2023-09-21 PROCEDURE — 99214 OFFICE O/P EST MOD 30 MIN: CPT | Mod: 95

## 2023-09-21 RX ORDER — VORTIOXETINE 20 MG/1
20 TABLET, FILM COATED ORAL
Qty: 30 | Refills: 1 | Status: DISCONTINUED | COMMUNITY
End: 2023-09-21

## 2023-09-22 ENCOUNTER — APPOINTMENT (OUTPATIENT)
Dept: PSYCHIATRY | Facility: CLINIC | Age: 48
End: 2023-09-22
Payer: COMMERCIAL

## 2023-09-22 PROCEDURE — 90837 PSYTX W PT 60 MINUTES: CPT | Mod: GT

## 2023-09-29 ENCOUNTER — APPOINTMENT (OUTPATIENT)
Dept: PSYCHIATRY | Facility: CLINIC | Age: 48
End: 2023-09-29
Payer: COMMERCIAL

## 2023-09-29 PROCEDURE — 90837 PSYTX W PT 60 MINUTES: CPT | Mod: GT

## 2023-10-06 ENCOUNTER — APPOINTMENT (OUTPATIENT)
Dept: PSYCHIATRY | Facility: CLINIC | Age: 48
End: 2023-10-06
Payer: COMMERCIAL

## 2023-10-06 PROCEDURE — 90837 PSYTX W PT 60 MINUTES: CPT | Mod: GT

## 2023-10-13 ENCOUNTER — APPOINTMENT (OUTPATIENT)
Dept: PSYCHIATRY | Facility: CLINIC | Age: 48
End: 2023-10-13
Payer: COMMERCIAL

## 2023-10-13 PROCEDURE — 90837 PSYTX W PT 60 MINUTES: CPT | Mod: GT

## 2023-10-27 ENCOUNTER — APPOINTMENT (OUTPATIENT)
Dept: PSYCHIATRY | Facility: CLINIC | Age: 48
End: 2023-10-27
Payer: COMMERCIAL

## 2023-10-27 PROCEDURE — 90837 PSYTX W PT 60 MINUTES: CPT | Mod: GT

## 2023-11-03 ENCOUNTER — APPOINTMENT (OUTPATIENT)
Dept: PSYCHIATRY | Facility: CLINIC | Age: 48
End: 2023-11-03
Payer: COMMERCIAL

## 2023-11-03 PROCEDURE — 90837 PSYTX W PT 60 MINUTES: CPT | Mod: GT

## 2023-11-10 ENCOUNTER — APPOINTMENT (OUTPATIENT)
Dept: PSYCHIATRY | Facility: CLINIC | Age: 48
End: 2023-11-10
Payer: COMMERCIAL

## 2023-11-10 PROCEDURE — 90837 PSYTX W PT 60 MINUTES: CPT | Mod: GT

## 2023-11-16 ENCOUNTER — APPOINTMENT (OUTPATIENT)
Dept: PSYCHIATRY | Facility: CLINIC | Age: 48
End: 2023-11-16

## 2023-11-21 ENCOUNTER — APPOINTMENT (OUTPATIENT)
Dept: PSYCHIATRY | Facility: CLINIC | Age: 48
End: 2023-11-21
Payer: COMMERCIAL

## 2023-11-21 PROCEDURE — 99214 OFFICE O/P EST MOD 30 MIN: CPT | Mod: 95

## 2023-11-21 RX ORDER — MIRTAZAPINE 30 MG/1
30 TABLET, FILM COATED ORAL
Qty: 30 | Refills: 1 | Status: DISCONTINUED | COMMUNITY
Start: 2023-03-01 | End: 2023-11-21

## 2024-01-23 ENCOUNTER — APPOINTMENT (OUTPATIENT)
Dept: PSYCHIATRY | Facility: CLINIC | Age: 49
End: 2024-01-23
Payer: COMMERCIAL

## 2024-01-23 PROCEDURE — 99214 OFFICE O/P EST MOD 30 MIN: CPT | Mod: 95

## 2024-01-23 RX ORDER — AMOXICILLIN AND CLAVULANATE POTASSIUM 875; 125 MG/1; MG/1
875-125 TABLET, COATED ORAL
Refills: 0 | Status: DISCONTINUED | COMMUNITY
Start: 2023-11-21 | End: 2024-01-23

## 2024-01-23 NOTE — PHYSICAL EXAM
[None] : none [Cooperative] : cooperative [Depressed] : depressed [Anxious] : anxious [Constricted] : constricted [Clear] : clear [Linear/Goal Directed] : linear/goal directed [Preoccupations/Ruminations] : preoccupations/ruminations [Depressive] : depressive [Average] : average [WNL] : within normal limits [Not applicable] : not applicable

## 2024-01-23 NOTE — DISCUSSION/SUMMARY
[FreeTextEntry1] : discussed coping with depression unresolved grief and active post traumatic symptoms anxious about possibility of return to work as she has been working from home Has anticipatory anxiety

## 2024-01-23 NOTE — HISTORY OF PRESENT ILLNESS
[FreeTextEntry1] : discussed stress over holidays Lexapro has helped More emotionally stable Grief is not constant Panic attacks are not  escalating like before - anxiety is still problematic Sleep also affected Uses Xanax as needed usually twice per week no suicidal or self injurious urges fearful recurrent panic attacks

## 2024-01-23 NOTE — PLAN
[No] : No [Medication education provided] : Medication education provided. [Rationale for medication choices, possible risks/precautions, benefits, alternative treatment choices, and consequences of non-treatment discussed] : Rationale for medication choices, possible risks/precautions, benefits, alternative treatment choices, and consequences of non-treatment discussed with patient/family/caregiver  [Order(s) for medication placed in [facility] EHR] : Medication

## 2024-01-23 NOTE — REASON FOR VISIT
[Patient preference] : as per patient preference [Access issues (e.g., transportation, impaired mobility, etc.)] : due to patient's access issues [Telehealth (audio & video) - Individual/Group] : This visit was provided via telehealth using real-time 2-way audio visual technology. [Medical Office: ([facility])___] : The provider was located at the medical office in [unfilled]. [Home] : The patient, [unfilled], was located at home, [unfilled], at the time of the visit. [Verbal consent obtained from patient/other participant(s)] : Verbal consent for telehealth/telephonic services obtained from patient/other participant(s) [Patient] : Patient [FreeTextEntry4] : 0003 [FreeTextEntry1] : medication managment

## 2024-01-25 ENCOUNTER — APPOINTMENT (OUTPATIENT)
Dept: PSYCHIATRY | Facility: CLINIC | Age: 49
End: 2024-01-25
Payer: COMMERCIAL

## 2024-01-25 PROCEDURE — 99213 OFFICE O/P EST LOW 20 MIN: CPT | Mod: 95

## 2024-04-03 ENCOUNTER — APPOINTMENT (OUTPATIENT)
Dept: PSYCHIATRY | Facility: CLINIC | Age: 49
End: 2024-04-03
Payer: COMMERCIAL

## 2024-04-03 VITALS
HEIGHT: 60 IN | SYSTOLIC BLOOD PRESSURE: 143 MMHG | OXYGEN SATURATION: 96 % | BODY MASS INDEX: 42.48 KG/M2 | HEART RATE: 92 BPM | DIASTOLIC BLOOD PRESSURE: 81 MMHG | WEIGHT: 216.38 LBS

## 2024-04-03 PROCEDURE — 99215 OFFICE O/P EST HI 40 MIN: CPT

## 2024-04-08 NOTE — REASON FOR VISIT
[Patient] : Patient [FreeTextEntry1] : Mary Ellen is a 47 YO female presenting today for a follow up visit for management of psychiatric symptoms

## 2024-04-08 NOTE — BEHAVIORAL HEALTH
[Prevent psychological harm to patient or another individual] : Prevent psychological harm to patient or another individual [FreeTextEntry1] : May be detrimental to therapeutic rapport.

## 2024-04-08 NOTE — PLAN
[No] : No [Medication education provided] : Medication education provided. [Rationale for medication choices, possible risks/precautions, benefits, alternative treatment choices, and consequences of non-treatment discussed] : Rationale for medication choices, possible risks/precautions, benefits, alternative treatment choices, and consequences of non-treatment discussed with patient/family/caregiver  [FreeTextEntry5] : Problems: 1. Mood and Anxiety Management - Changes:  Titrate Lexapro to 30mg once daily -Continue the current medication regime for prn's  - Monitor symptoms and consider further evaluation/medication intervention if symptoms worsen/persist - Encourage ongoing engagement in outpatient individual psychotherapy - Discussed healthy daily routines including: exercise, mindfulness activities, and leisure activities - Continue outpatient psychotherapy for ongoing coping skills and emotional support.  Sleep management: - Initiate Prazosin 1 mg at bedtime  - Review and reinforce sleep hygiene practices, including aromatherapy, routine sleep and wake times, changing environment, and mindfulness meditation before sleep  - Educated patient on sleep hygiene and importance of maintaining sleep routine, goal to obtain 8 hours of sleep nightly   Medication changes this visit: Lexapro 30mg, Prazosin 1mg, Propranolol PRN   Follow-up and Monitoring:   - Patient was given opportunity to ask questions, all questions were answered, patient is agreeable to treatment plan   - Psychoeducation and supportive therapy provided, and discussed rationale for the recommended medications   - Educated patient of importance of remaining abstinent from drugs and alcohol, risks of short-term and long-term use, hazards of combining with medications   - Emergency procedures were discussed: Pt educated to call [phone] or go to the nearest ER for worsening symptoms/suicidal/homicidal ideations   - Follow-up appointment scheduled for : 4 weeks     Plan for next visit:   - Assess patient's response to medication changes and overall mental health status.   - Address any new symptoms or concerns that may arise.

## 2024-04-08 NOTE — SOCIAL HISTORY
[FreeTextEntry1] : Patient is a 48 year old female, , partnered, working full time, no dependents, residing in a private home in [facility]. She has hx of trauma/grief surrounding passing of her child. She expresses adequate social support and has no access to a firearm. She denies problematic ETOH use, illicit drug use, utilizes nicotine 1ppd.

## 2024-04-08 NOTE — PHYSICAL EXAM
[None] : none [Depressed] : depressed [Anxious] : anxious [Linear/Goal Directed] : linear/goal directed [Clear] : clear [Preoccupations/Ruminations] : preoccupations/ruminations [Depressive] : depressive [Average] : average [WNL] : within normal limits [Not applicable] : not applicable [Hostile] : hostile [Irritable] : irritable

## 2024-04-08 NOTE — HISTORY OF PRESENT ILLNESS
[FreeTextEntry1] :  MARIANA is being seen today for a follow up visit for continuation of pharmacologic/psychiatric management.   Treatment plan changes since last visit: None   CC: "I have terrible panic disorder"   MARIANA reports compliance with the prescribed medication regime, tolerating well, no reported medication issues. Mood is " anxious." She reports moderate depressive symptoms including sadness, decreased motivation, anhedonia and sleep disturbances including nightmares and poor sleep quality. She reports moderate anxiety symptoms including irritability, constant worries, low frustration tolerance, restlessness and body tension. She reports by the end of the work of the work week she is "drained."  She reports episodes of panic 2-3x a week and utilizes Alprazolam with good therapeutic effect, no longer driving related to acute panic while driving. Expressing interested in changing medication regimen for ongoing therapeutic benefits. She denies No SIIP/HIIP/ SIB, hypomanic/ manic mood features, AVH, No delusions or paranoia were elicited. Remains in good overall medical health, she has hx of mitral valve prolapse followed by cardiologist, no medication interventions. Denies increased or problematic substance use. Engaged in work, social settings and interpersonal relationships, psychotherapy, and maintains functional ADLs Expresses future motivation and engages well in interview. Amenable to ongoing medication titration. Ramsey need refills today.

## 2024-04-08 NOTE — DISCUSSION/SUMMARY
[FreeTextEntry1] :  MARIANA is being seen today for a follow up visit for continuation of psychiatric/pharmacologic management.    [facility] ISTOP: reference #[id number]  1/22/2024 Lorazepam 1mg 30/30   MPRESSION: CHELA, Panic Disorder , Mood disorder, unspecified  DDX: Insomnia related to psychiatric condition, Adjustment Disorder, PTSD   Assessment: Mood and anxiety sxs: Evidence of moderate depressive and anxiety symptoms. Evidence of ongoing episodes of panic with regular use prn's 2-3x weekly with good therapeutic affect. No SIIP/HIIP/SIB/AVH/Psychosis. No evidence of benzodiazepine abuse/misuse Sleep sxs: Troubles with sleep initiation/quality, regular nightmares.  Safety concerns: No identifiable risk, patient is not a harm risk to self or others. Functional status: No identifiable impairments Medications:  Standing: Lexapro 20mg orally once daily; mood PRN: Alprazolam 1 mg orally as needed; Anxiety

## 2024-04-22 ENCOUNTER — APPOINTMENT (OUTPATIENT)
Dept: PSYCHIATRY | Facility: CLINIC | Age: 49
End: 2024-04-22
Payer: COMMERCIAL

## 2024-04-22 PROCEDURE — 90833 PSYTX W PT W E/M 30 MIN: CPT | Mod: 95

## 2024-04-22 PROCEDURE — 99213 OFFICE O/P EST LOW 20 MIN: CPT | Mod: 95

## 2024-04-22 NOTE — PHYSICAL EXAM
[Cooperative] : cooperative [Anxious] : anxious [Full] : full [Clear] : clear [Linear/Goal Directed] : linear/goal directed [None] : none [None Reported] : none reported [Average] : average [WNL] : within normal limits [Not applicable] : not applicable

## 2024-04-22 NOTE — DISCUSSION/SUMMARY
[FreeTextEntry1] :  MARIANA is being seen today for a follow up visit for continuation of psychiatric/pharmacologic management.    [facility] ISTOP: Reference #: [id number] 04/03/2024	04/04/2024	alprazolam 1 mg tablet	60	30  MPRESSION: CHELA, Panic Disorder , Mood disorder, unspecified  DDX: Insomnia related to psychiatric condition, Adjustment Disorder, PTSD   Assessment: Mood and anxiety sxs: Improved mood with  Evidence of ongoing episodes of panic with regular use prn's 2-3x weekly with good therapeutic affect. No SIIP/HIIP/SIB/AVH/Psychosis. No evidence of benzodiazepine abuse/misuse Sleep sxs: Troubles with sleep initiation/quality, regular nightmares.  Safety concerns: No identifiable risk, patient is not a harm risk to self or others. Functional status: No identifiable impairments Medications:  Standing: Lexapro 30mg orally once daily; mood PRN: Alprazolam 1 mg orally as needed; Anxiety; Propranolol 10mg orally (1-2tabs) once daily as needed for anxiety;

## 2024-04-22 NOTE — SOCIAL HISTORY
[FreeTextEntry1] : Patient is a 48 year old female, , partnered, working full time, no dependents, residing in a private home in [facility]. She has hx of trauma/grief surrounding passing of her child. She expresses adequate social support and has no access to a firearm. She denies problematic ETOH use, illicit drug use, utilizes nicotine 1ppd.

## 2024-04-22 NOTE — PLAN
[No] : No [Medication education provided] : Medication education provided. [Rationale for medication choices, possible risks/precautions, benefits, alternative treatment choices, and consequences of non-treatment discussed] : Rationale for medication choices, possible risks/precautions, benefits, alternative treatment choices, and consequences of non-treatment discussed with patient/family/caregiver  [Order(s) for medication placed in [facility] EHR] : Medication [FreeTextEntry4] : INTERVENTION: BRIEF CBT, SUPPORTIVE PSYCHOTHERAPY, BEHAVIORAL MODIFICATION     PROBLEM: Anxiety/ Depression/ PTSD   LONG TERM GOAL: Alleviate psychiatric symptoms so as the patient may return to normal functioning.   TIME SPENT PERFORMING THERAPY: 16 MINUTES   DISCUSSION: Explored patient desire for change including functional achievable steps for improvement in psychiatric symptoms. Explored a best practice approach to improve both symptoms and functional impact associated with patients' diagnosis. Examined patient outlook on mental health and well-being, including discussion of personal strengths and plan for success despite stressors. Provider validated patient's emotion/experience. Explore solution focused strategies to reduced stressors including behavioral/ thought modifications. Focused on positive achievements towards mental health goals, and instilled hope. Reviewed/reinforced psychoeducation on medication regime, diagnosis and treatment plan. Discussed/reviewed the incorporation of holistic modalities to enhance mood. Supportive listening and interactive feedback were provided. Focused on positive achievements towards mental health goals, and instilled hope.      PATIENT RESPONSE: Patient participated actively and remained engaged in today's session, patient is progressing well towards long-term treatment goal.      [FreeTextEntry5] : Problems: 1. Mood and Anxiety Management - Changes: Lexapro to 30mg once daily - Will plan to add mood stabilizer -Continue the current medication regime for prn's  - Monitor symptoms and consider further evaluation/medication intervention if symptoms worsen/persist - Encourage ongoing engagement in outpatient individual psychotherapy - Discussed healthy daily routines including: exercise, mindfulness activities, and leisure activities - Continue outpatient psychotherapy for ongoing coping skills and emotional support.  Sleep management: - Prazosin 2 mg once at bedtime, okay to increase to 3mg after 1 week.  - Review and reinforce sleep hygiene practices, including aromatherapy, routine sleep and wake times, changing environment, and mindfulness meditation before sleep  - Educated patient on sleep hygiene and importance of maintaining sleep routine, goal to obtain 8 hours of sleep nightly   Medication changes this visit: Trazodone 50mg (1-2 tabs) orally once at bedtime as needed   Follow-up and Monitoring:   - Patient was given opportunity to ask questions, all questions were answered, patient is agreeable to treatment plan   - Psychoeducation and supportive therapy provided, and discussed rationale for the recommended medications   - Educated patient of importance of remaining abstinent from drugs and alcohol, risks of short-term and long-term use, hazards of combining with medications   - Emergency procedures were discussed: Pt educated to call [phone] or go to the nearest ER for worsening symptoms/suicidal/homicidal ideations   - Follow-up appointment scheduled for : 4 weeks     Plan for next visit:   - Assess patient's response to medication changes and overall mental health status.   - Address any new symptoms or concerns that may arise.

## 2024-04-22 NOTE — HISTORY OF PRESENT ILLNESS
[FreeTextEntry1] :  MARIANA is being seen today for a follow up visit for continuation of pharmacologic/psychiatric management.   Treatment plan changes since last visit: Increase Lexapro, Started on Prazosin   CC: "The increase in Lexapro has been very helpful."   MARIANA reports compliance with the prescribed medication regime, tolerating well, no reported medication issues. Reporting that increase in Lexapro has been very helpful with improving mood, noting she remains with low frustration tolerance and emotionally labile typically in response to situational stressors, remains ongoing panic attacks and intermittent anxiety. Regarding sleep she notes that Prazosin has not helped with reducing nightmares, feels exhausted and tired throughout, is open to increasing dose for ongoing therapeutic benefits.   Expressing interested in ongoing changing medication regimen for ongoing therapeutic benefits. She denies No SIIP/HIIP/ SIB, hypomanic/ manic mood features, AVH, No delusions or paranoia were elicited. Remains in good overall medical health without interval changes. Denies increased or problematic substance use. Engaged in work, social settings and interpersonal relationships, psychotherapy, and maintains functional ADLs Expresses future motivation and engages well in interview. Amenable to ongoing medication titration. Ramsey need refills today.

## 2024-04-22 NOTE — REASON FOR VISIT
[Patient] : Patient [Patient preference] : as per patient preference [Telehealth (audio & video) - Individual/Group] : This visit was provided via telehealth using real-time 2-way audio visual technology. [Medical Office: ([facility])___] : The provider was located at the medical office in [unfilled]. [Home] : The patient, [unfilled], was located at home, [unfilled], at the time of the visit. [FreeTextEntry4] : 9896 [FreeTextEntry5] : 100 [FreeTextEntry1] : Mary Ellen is a 47 YO female presenting today for a follow up visit for management of psychiatric symptoms

## 2024-05-07 ENCOUNTER — RX RENEWAL (OUTPATIENT)
Age: 49
End: 2024-05-07

## 2024-05-07 ENCOUNTER — APPOINTMENT (OUTPATIENT)
Dept: PSYCHIATRY | Facility: CLINIC | Age: 49
End: 2024-05-07
Payer: COMMERCIAL

## 2024-05-07 DIAGNOSIS — F43.29 ADJUSTMENT DISORDER WITH OTHER SYMPTOMS: ICD-10-CM

## 2024-05-07 PROCEDURE — 99214 OFFICE O/P EST MOD 30 MIN: CPT | Mod: 95

## 2024-05-07 PROCEDURE — 90833 PSYTX W PT W E/M 30 MIN: CPT | Mod: 95

## 2024-05-07 NOTE — REASON FOR VISIT
[Patient preference] : as per patient preference [Telehealth (audio & video) - Individual/Group] : This visit was provided via telehealth using real-time 2-way audio visual technology. [Patient] : Patient [Other Location: e.g. Home (Enter Location, City,State)___] : The provider was located at [unfilled]. [Home] : The patient, [unfilled], was located at home, [unfilled], at the time of the visit. [FreeTextEntry4] : 1000 [FreeTextEntry5] : 1039 [FreeTextEntry1] : Mary Ellen is a 49 YO female presenting today for a follow up visit for management of psychiatric symptoms

## 2024-05-07 NOTE — SOCIAL HISTORY
[FreeTextEntry1] : Patient is a 48 year old female, , partnered, working full time, no dependents, residing in a private home in [facility]. She has hx of trauma/grief surrounding passing of her child. She expresses adequate social support and has no access to a firearm. She denies problematic ETOH use, illicit drug use, utilizes nicotine 1ppd.

## 2024-05-07 NOTE — HISTORY OF PRESENT ILLNESS
[FreeTextEntry1] :  MARIANA is being seen today for a follow up visit for continuation of pharmacologic/psychiatric management.   Treatment plan changes since last visit: Started on Trazodone, Increased Prazosin   CC:  No nightmares, anxiety has improved   MARIANA reports compliance with the prescribed medication regime, tolerating well, no reported medication issues. Reports propranolol increase was helpful with reducing somatic symptoms of anxiety particularly used prior to work. She has not experienced any nightmares since dose of Prazosin was increased. Trazodone is helpful in helping with remaining asleep, though did not improve sleep latency. When she took two pills felt too tired in AM. She will utilize Alprazolam as needed for acute panic, typically only once daily. Mood is stable, feels overall anxiety has improved significantly.  Expressing interested in ongoing changing medication regimen for ongoing therapeutic benefits. She denies No SIIP/HIIP/ SIB, hypomanic/ manic mood features, AVH, No delusions or paranoia were elicited. Remains in good overall medical health, inquires about wt loss medication with current psychotropic medication regime. Denies increased or problematic substance use. Engaged in work, social settings and interpersonal relationships, psychotherapy, and maintains functional ADLs Expresses future motivation and engages well in interview.  Will need refills today.

## 2024-05-07 NOTE — BEHAVIORAL HEALTH
[facility] Hospitalist Consult H & P Note          Saige Ramirez is being seen at the request of Dr. Nic Hobbs, patient was admitted with worsening anxiety, depression, self injury      History Of Present Illness  Saige Ramirez  is a 19 year old choose not to disclose presenting with significant past med history of asthma, anxiety, bipolar disorder, depression, eczema, IBS with nausea vomiting, PTSD who was brought to the emergency room due to self injury and cutting her left forearm multiple times.  Every patient had an argument with the family parents and got agitated.  Patient had previous histories of suicidal ideation and self-harm.  Patient also complaining of nausea and vomiting in the morning due to underlying IBS.  Patient was told that he has a history of hyperlipidemia but has not been on any medication.  Patient denies any chest pain or shortness of breath or palpitation.      Past Medical History  Past Medical History:   Diagnosis Date   • Anxiety    • Bipolar I disorder, most recent episode depressed (CMD)    • Depression    • Eczema    • IBS (irritable bowel syndrome)    • PTSD (post-traumatic stress disorder)         Surgical History  Past Surgical History:   Procedure Laterality Date   • No past surgeries          Social History  Social History     Tobacco Use   • Smoking status: Never   • Smokeless tobacco: Never   Vaping Use   • Vaping Use: Every day   • Substances: Nicotine, THC   Substance Use Topics   • Alcohol use: Never   • Drug use: Yes     Frequency: 7.0 times per week     Types: Marijuana       Family History   father with hyperlipidemia  Allergies  ALLERGIES:  No Known Allergies     Medications  Current Facility-Administered Medications   Medication Dose Route Frequency Provider Last Rate Last Admin   • albuterol inhaler 2 puff  2 puff Inhalation Q6H Resp PRN Kalin Suarez MD       • atorvastatin (LIPITOR) tablet 10 mg  10 mg Oral Nightly Kalin Suarez MD       •  [Prevent psychological harm to patient or another individual] : Prevent psychological harm to patient or another individual NON FORMULARY 1 tablet  1 tablet Oral Daily Kalin Suarez MD       • traZODone (DESYREL) tablet 50 mg  50 mg Oral Nightly PRN Nic Hobbs MD   50 mg at 07/17/23 2338   • benztropine mesylate (COGENTIN) 1 MG/ML injection 1 mg  1 mg Intramuscular Q8H PRN Nic Hobbs MD        Or   • benztropine (COGENTIN) tablet 1 mg  1 mg Oral Q8H PRN Nic Hobbs MD       • hydrOXYzine (ATARAX) tablet 50 mg  50 mg Oral Q4H PRN Nic Hobbs MD   50 mg at 07/18/23 0802   • LORazepam (ATIVAN) injection 1 mg  1 mg Intravenous Q4H PRN Nic Hobbs MD        Or   • LORazepam (ATIVAN) tablet 1 mg  1 mg Oral Q4H PRN Nic Hobbs MD   1 mg at 07/17/23 2338   • haloperidol lactate (HALDOL) 5 MG/ML short-acting injection 5 mg  5 mg Intramuscular Q4H PRN Nic Hobbs MD        Or   • haloperidol (HALDOL) tablet 5 mg  5 mg Oral Q4H PRN Nic Hobbs MD       • acetaminophen (TYLENOL) tablet 650 mg  650 mg Oral Q4H PRN Nic Hobbs MD       • polyethylene glycol (MIRALAX) packet 17 g  17 g Oral Daily PRN Nic Hobbs MD       • aluminum-magnesium hydroxide-simethicone (MAALOX) 200-200-20 MG/5ML suspension 30 mL  30 mL Oral Q4H PRN Nic Hobbs MD       • magnesium hydroxide (MILK OF MAGNESIA) 400 MG/5ML suspension 30 mL  30 mL Oral Daily PRN Nic Hobbs MD       • nicotine (NICODERM) 21 MG/24HR patch 1 patch  1 patch Transdermal Daily Nic Hobbs MD   1 patch at 07/18/23 0801     Prior to Admission medications    Medication Sig Start Date End Date Taking? Authorizing Provider   albuterol 108 (90 Base) MCG/ACT inhaler Inhale 2 puffs into the lungs every 6 hours as needed for Wheezing. 5/4/23  Yes Provider, Outside   lamoTRIgine (LaMICtal) 150 MG tablet Take 1 tablet by mouth in the morning and 1 tablet in the evening. 4/25/23  Yes Nic Hobbs MD   vilazodone (VIIBRYD) 40 MG tablet Take 1 tablet by mouth daily. 4/25/23  Yes Nic Hobbs MD   hydrOXYzine (ATARAX)  [FreeTextEntry1] : May be detrimental to therapeutic rapport. 50 MG tablet Take 1 tablet by mouth 4 times daily. 4/25/23  Yes Nic Hobbs MD   gabapentin (NEURONTIN) 300 MG capsule Take 2 capsules by mouth in the morning and 2 capsules in the evening. 4/25/23  Yes Nic Hobbs MD   clonazePAM (KlonoPIN) 0.5 MG tablet Take 1 tablet by mouth in the morning and 1 tablet in the evening. 4/25/23  Yes Nic Hobbs MD   loratadine (CLARITIN) 10 MG tablet Take 10 mg by mouth daily. 6/8/23   Provider, Outside   guanFACINE (INTUNIV) 2 MG TABLET SR 24 HR Take 1 tablet by mouth nightly. 4/25/23   Nic Hobbs MD   mirtazapine (REMERON) 15 MG tablet Take 1 tablet by mouth nightly. 4/25/23   Nic Hobbs MD   Tri-Lo-Sprintec 0.18/0.215/0.25 MG-25 MCG tablet Take 1 tablet by mouth daily. 3/16/23   Provider, Outside        Review of Systems    All review of system was reviewed otherwise negative as mentioned in the HPI and below  Constitutional: No fevers, chills, night sweats, no weight change no swollen glands in the neck, axilla, or groin  Skin: No rashes or nonhealing ulcers.  Eyes: No diplopia, eye pain, or recent worsening in visual acuity.  ENT: No sore throat or hearing loss, no nasal congestion  Endocrine: No polyuria or nocturia, cold or heat intolerance  Cardiovascular: No chest pain, no palpitations, dyspnea on exertion, no lower extremity edema  Respiratory: No history of asthma, COPD, exertional shortness of breath, or TIGRE  Gastrointestinal: Nausea vomiting in the morning, denies diarrhea, abdominal pain, no hematemesis melena or hematochezia  Musculoskeletal: No joint swelling,   Neurologic: No headache, dizziness, history of TIA or stroke  Hematologic: No unusual bruising or bleeding.  No clotting or bleeding disorders.  Psychiatric: PTSD, depression, anxiety     Physical Exam    VITAL SIGNS:    Vital Last Value 24 Hour Range   Temperature 97.5 °F (36.4 °C) (07/18/23 0700) Temp  Min: 97.5 °F (36.4 °C)  Max: 98.8 °F (37.1 °C)   Pulse 81 (07/18/23 0700)  Pulse  Min: 81  Max: 100   Respiratory 16 (07/18/23 0700) Resp  Min: 16  Max: 18   Non-Invasive  Blood Pressure 111/76 (07/18/23 0700) BP  Min: 111/76  Max: 126/83   Pulse Oximetry 99 % (07/18/23 0700) SpO2  Min: 99 %  Max: 99 %     Vital Today Admitted   Weight 51.7 kg (113 lb 15.7 oz) (07/17/23 2300) Weight: 57 kg (125 lb 10.6 oz) (07/17/23 1656)   Height N/A Height: 5' 1\" (154.9 cm) (07/17/23 2300)   Body Mass Index N/A BMI (Calculated): 21.54 (07/17/23 2300)         General: No acute distress  Eyes: Extraocular muscles are intact, pupil round and equal reactive to light and accommodation, anicteric  HENT: Normocephalic, oral mucosa is moist.  Neck: Supple, non-tender, no jugular vein distention  Respiratory: Lungs clear to auscultation bilaterally, respirations non-labored, breath sounds equal, symmetrical chest wall expansion  Cardiovascular: Normal rate, regular rhythm, no murmur, good pulses equal in all extremities, normal peripheral perfusion, no lower extremity pitting edema.  Gastrointestinal : Soft, non-tender, no obvious distension, no organomegly, active bowel sounds.  Genitourinary: No costovertebral angle tenderness.  Lymphatics: No lymphadenopathy in neck  Musculoskeletal: Grossly normal and symmetric movements in all extremities, no tenderness, no swelling, no deformity  Integumentary: Warm, dry, well perfused, intact, no jaundice  Neurologic: Alert and oriented x3,  normal sensory, normal motor function, no obvious focal deficits.  Cognition and Speech: Oriented, speech clear and coherent, functional cognition intact   Psychiatric: Cooperative, appropriate mood and affect      Imaging    No orders to display       EKG:  No results found for this or any previous visit (from the past 4464 hour(s)).    No results found for this or any previous visit.       Labs     Admission on 07/17/2023   Component Date Value Ref Range Status   • Sodium 07/17/2023 137  135 - 145 mmol/L Final   • Potassium  07/17/2023 3.4  3.4 - 5.1 mmol/L Final    Slight hemolysis, result may be falsely increased.   • Chloride 07/17/2023 106  97 - 110 mmol/L Final   • Carbon Dioxide 07/17/2023 27  21 - 32 mmol/L Final   • Anion Gap 07/17/2023 7  7 - 19 mmol/L Final   • Glucose 07/17/2023 84  70 - 99 mg/dL Final   • BUN 07/17/2023 11  6 - 20 mg/dL Final   • Creatinine 07/17/2023 1.02 (H)  0.51 - 0.95 mg/dL Final   • Glomerular Filtration Rate 07/17/2023 81  >=60 Final    eGFR results = or >60 mL/min/1.73m2 = Normal kidney function. Estimated GFR calculated using the CKD-EPI-R (2021) equation that does not include race in the creatinine calculation.   • BUN/Cr 07/17/2023 11  7 - 25 Final   • Calcium 07/17/2023 9.5  8.4 - 10.2 mg/dL Final   • Alcohol 07/17/2023 None Detected  None Detected mg/dL Final   • HCG, Quantitative 07/17/2023 <2  mUnits/mL Final   • Amphetamines, Urine 07/17/2023 Negative  Negative Final    Cutoff = 500 ng/mL   • Barbiturates, Urine 07/17/2023 Negative  Negative Final    Cutoff = 200 ng/mL   • Benzodiazepines, Urine 07/17/2023 Negative  Negative Final    Cutoff = 200 ng/mL   • Cocaine/ Metabolite, Urine 07/17/2023 Negative  Negative Final    Cutoff = 150 ng/ml   • Opiates, Urine 07/17/2023 Negative  Negative Final    Cutoff = 300 ng/mL   • Phencyclidine, Urine 07/17/2023 Negative  Negative Final    Cutoff = 25 ng/mL   • Cannabinoids, Urine 07/17/2023 Positive (A)  Negative Final    Cutoff = 50 ng/mL   • Fentanyl, Urine Screen 07/17/2023 Negative  Negative Final    Screen Cutoff Concentration: 1.0 ng/mL   • Rapid SARS-COV-2 by PCR 07/17/2023 Not Detected  Not Detected / Detected / Presumptive Positive / Inhibitors present Final   • Isolation Guidelines 07/17/2023    Final    Do not use this test result as the sole decision-maker for discontinuation of isolation.   Clinical evaluation should be considered for other respiratory illness requiring transmission-based isolation.    -    No fever (<99.0 F/37.2 C) for at  least 24 hours without the use of fever-reducing medications    AND  -    Respiratory symptoms have improved or resolved (e.g. cough, shortness of breath)     AND  -    COVID-19 negative test    See COVID-19 Deisolation Resource Guide   • Procedural Comment 07/17/2023    Final    SARS-CoV-2 nucleic acid has not been detected.     Testing was performed using the [employer] Xpert RT-PCR assay that has been given Emergency Use Authorization (EUA) by the [place] Food and Drug Administration (FDA). These results are considered definitive and do not need to be confirmed by another method.   • WBC 07/17/2023 14.2 (H)  4.2 - 11.0 K/mcL Final   • RBC 07/17/2023 4.11  4.00 - 5.20 mil/mcL Final   • HGB 07/17/2023 11.9 (L)  12.0 - 17.0 g/dL Final   • HCT 07/17/2023 36.4  36.0 - 51.0 % Final   • MCV 07/17/2023 88.6  78.0 - 100.0 fl Final   • MCH 07/17/2023 29.0  26.0 - 34.0 pg Final   • MCHC 07/17/2023 32.7  32.0 - 36.5 g/dL Final   • RDW-CV 07/17/2023 13.0  11.0 - 15.0 % Final   • RDW-SD 07/17/2023 42.0  39.0 - 50.0 fL Final   • PLT 07/17/2023 553 (H)  140 - 450 K/mcL Final   • NRBC 07/17/2023 0  <=0 /100 WBC Final   • Neutrophil, Percent 07/17/2023 69  % Final   • Lymphocytes, Percent 07/17/2023 16  % Final   • Mono, Percent 07/17/2023 7  % Final   • Eosinophils, Percent 07/17/2023 7  % Final   • Basophils, Percent 07/17/2023 1  % Final   • Immature Granulocytes 07/17/2023 0  % Final   • Absolute Neutrophils 07/17/2023 9.7 (H)  1.8 - 8.0 K/mcL Final   • Absolute Lymphocytes 07/17/2023 2.3  1.2 - 5.2 K/mcL Final   • Absolute Monocytes 07/17/2023 1.0 (H)  0.3 - 0.9 K/mcL Final   • Absolute Eosinophils  07/17/2023 1.0 (H)  0.0 - 0.5 K/mcL Final   • Absolute Basophils 07/17/2023 0.1  0.0 - 0.3 K/mcL Final   • Absolute Immature Granulocytes 07/17/2023 0.1  0.0 - 0.2 K/mcL Final   • Albumin 07/17/2023 4.3  3.6 - 5.1 g/dL Final   • Bilirubin, Total 07/17/2023 0.5  0.2 - 1.0 mg/dL Final   • Bilirubin, Direct 07/17/2023 0.1  0.0 -  0.2 mg/dL Final   • Alkaline Phosphatase 07/17/2023 73  42 - 110 Units/L Final   • GPT/ALT 07/17/2023 30  <64 Units/L Final   • GOT/AST 07/17/2023 37  <=37 Units/L Final    Slight hemolysis, result may be falsely increased.   • Protein, Total 07/17/2023 8.5 (H)  6.4 - 8.2 g/dL Final   • COLOR, URINALYSIS 07/17/2023 Yellow   Final   • APPEARANCE, URINALYSIS 07/17/2023 Cloudy   Final   • GLUCOSE, URINALYSIS 07/17/2023 Negative  Negative mg/dL Final   • BILIRUBIN, URINALYSIS 07/17/2023 Negative  Negative Final   • KETONES, URINALYSIS 07/17/2023 15 (A)  Negative mg/dL Final   • SPECIFIC GRAVITY, URINALYSIS 07/17/2023 1.017  1.005 - 1.030 Final    Measured by refractometry   • OCCULT BLOOD, URINALYSIS 07/17/2023 Small (A)  Negative Final   • PH, URINALYSIS 07/17/2023 6.0  5.0 - 7.0 Final   • PROTEIN, URINALYSIS 07/17/2023 100 (A)  Negative mg/dL Final   • UROBILINOGEN, URINALYSIS 07/17/2023 0.2  0.2, 1.0 mg/dL Final   • NITRITE, URINALYSIS 07/17/2023 Negative  Negative Final   • LEUKOCYTE ESTERASE, URINALYSIS 07/17/2023 Small (A)  Negative Final   • SQUAMOUS EPITHELIAL, URINALYSIS 07/17/2023 6 to 10 (A)  None Seen, 1 to 5 /hpf Final   • ERYTHROCYTES, URINALYSIS 07/17/2023 6 to 10 (A)  None Seen, 1 to 2 /hpf Final   • LEUKOCYTES, URINALYSIS 07/17/2023 6 to 10 (A)  None Seen, 1 to 5 /hpf Final   • BACTERIA, URINALYSIS 07/17/2023 None Seen  None Seen /hpf Final   • HYALINE CASTS, URINALYSIS 07/17/2023 11 to 25 (A)  None Seen, 1 to 5 /lpf Final   • TRANSITIONAL EPITHELIALS 07/17/2023 1 to 5  1 to 5, None Seen /hpf Final   • MUCUS 07/17/2023 Present   Final   • Cholesterol 07/18/2023 302 (H)  <=169 mg/dL Final      Desirable         <170  Borderline High   170 to 199  High              >=200   • Triglycerides 07/18/2023 111 (H)  <=89 mg/dL Final      Desirable         <90  Borderline High   90 to 129  High              >=130   • HDL 07/18/2023 78  >=46 mg/dL Final      Low              <40  Borderline Low   40 to 45  Optimal           >45   • LDL 07/18/2023 202 (H)  <=109 mg/dL Final      Desirable         <110  Borderline High   110 to 129  High              >=130   • Non-HDL Cholesterol 07/18/2023 224 (H)  <=119 mg/dL Final      Desirable         <120  Borderline High   120 to 144  High              >=145   • Cholesterol/ HDL Ratio 07/18/2023 3.9  <=4.4 Final   • TSH 07/18/2023 5.730 (H)  0.463 - 4.130 mcUnits/mL Final    TSH with Reflex Testing: Abnormal TSH values are followed up with FT4 testing. When TSH values are below the normal range and FT4 values are within expected normal ranges, FT3 testing is performed.   • T4, Free 07/18/2023 1.1  0.8 - 1.3 ng/dL Final    Findings most consistent with subclinical hypothyroidism.    (Reflex TSH algorithm is not recommended in hospitalized patients. A variety of drugs, as well as serious acute and chronic illnesses may alter thyroid function tests. Commonly implicated drugs include glucocorticoids, dopamine, carbamazepine, iodine, amiodarone, lithium and heparin.)         Assessment and Plan    Mild intermittent asthma without complication  Assessment & Plan  No wheezing, albuterol as needed    Bipolar I disorder, most recent episode depressed (CMD)  Assessment & Plan  Further care per primary psychiatric     Pure hypercholesterolemia  Assessment & Plan  Patient has been previously diagnosed with hyperlipidemia, has not been watching her diet, physically active.  Within normal limits, liver plan to start patient on atorvastatin 10 mg daily lipid.  Dietary instruction was given to patient, increase physical activity, exercise  To repeat lipid profile in 3 months    Subclinical hypothyroidism  Assessment & Plan  Patient with mild weighted TSH but normal free T4.  Will monitor    IBS (irritable bowel syndrome)  Assessment & Plan  Due to underlying anxiety, patient has been taking clonazepam.  Denies any nausea or vomiting today, no diarrhea             Kalin Suarez MD  7/18/2023

## 2024-05-07 NOTE — PLAN
[No] : No [Medication education provided] : Medication education provided. [Rationale for medication choices, possible risks/precautions, benefits, alternative treatment choices, and consequences of non-treatment discussed] : Rationale for medication choices, possible risks/precautions, benefits, alternative treatment choices, and consequences of non-treatment discussed with patient/family/caregiver  [Order(s) for medication placed in [facility] EHR] : Medication [FreeTextEntry4] : INTERVENTION: BRIEF CBT, SUPPORTIVE PSYCHOTHERAPY, BEHAVIORAL MODIFICATION     PROBLEM: Anxiety/ Depression/ PTSD   LONG TERM GOAL: Alleviate psychiatric symptoms so as the patient may return to normal functioning.   TIME SPENT PERFORMING THERAPY: 16 MINUTES   DISCUSSION: Explored patient desire for change including functional achievable steps for improvement in psychiatric symptoms. Explored a best practice approach to improve both symptoms and functional impact associated with patients' diagnosis. Examined patient outlook on mental health and well-being, including discussion of personal strengths and plan for success despite stressors. Provider validated patient's emotion/experience. Explore solution focused strategies to reduced stressors including behavioral/ thought modifications. Focused on positive achievements towards mental health goals, and instilled hope. Reviewed/reinforced psychoeducation on medication regime, diagnosis and treatment plan. Discussed/reviewed the incorporation of holistic modalities to enhance mood. Supportive listening and interactive feedback were provided. Focused on positive achievements towards mental health goals, and instilled hope.      PATIENT RESPONSE: Patient participated actively and remained engaged in today's session, patient is progressing well towards long-term treatment goal.      [FreeTextEntry5] : Problems: 1. Mood and Anxiety Management - Continue the current medication regimen - Will plan to add mood stabilizer in future -Continue the current medication regime for prn's  - Monitor symptoms and consider further evaluation/medication intervention if symptoms worsen/persist - Encourage ongoing engagement in outpatient individual psychotherapy - Discussed healthy daily routines including: exercise, mindfulness activities, and leisure activities - Continue outpatient psychotherapy for ongoing coping skills and emotional support.  Sleep management: - Prazosin 3 mg once at bedtime, Trazodone 50mg (1-2 tabs) orally once at bedtime as needed - Add Vistaril 25mg (1-2 tabs) at bedtime as needed - Review and reinforce sleep hygiene practices, including aromatherapy, routine sleep and wake times, changing environment, and mindfulness meditation before sleep  - Educated patient on sleep hygiene and importance of maintaining sleep routine, goal to obtain 8 hours of sleep nightly  Wt. Loss Discussed with PCP Patient is considering Qsymia; aware of stimulant which may exacerbated anxiety; additionally will hold off on addition of mood stabilizer at this time.  Medication changes this visit:    Follow-up and Monitoring:   - Patient was given opportunity to ask questions, all questions were answered, patient is agreeable to treatment plan   - Psychoeducation and supportive therapy provided, and discussed rationale for the recommended medications   - Educated patient of importance of remaining abstinent from drugs and alcohol, risks of short-term and long-term use, hazards of combining with medications   - Emergency procedures were discussed: Pt educated to call [phone], 911 or go to the nearest ER for worsening symptoms/suicidal/homicidal ideations   - Follow-up appointment scheduled for : 2 weeks     Plan for next visit:   - Assess patient's response to medication changes and overall mental health status.   - Address any new symptoms or concerns that may arise.

## 2024-05-07 NOTE — PHYSICAL EXAM
[Cooperative] : cooperative [Full] : full [Clear] : clear [Linear/Goal Directed] : linear/goal directed [None] : none [None Reported] : none reported [Average] : average [WNL] : within normal limits [Not applicable] : not applicable [Euthymic] : euthymic

## 2024-05-21 ENCOUNTER — APPOINTMENT (OUTPATIENT)
Dept: PSYCHIATRY | Facility: CLINIC | Age: 49
End: 2024-05-21
Payer: COMMERCIAL

## 2024-05-21 PROCEDURE — 90833 PSYTX W PT W E/M 30 MIN: CPT | Mod: 95

## 2024-05-21 PROCEDURE — 99214 OFFICE O/P EST MOD 30 MIN: CPT | Mod: 95

## 2024-05-21 RX ORDER — TRAZODONE HYDROCHLORIDE 50 MG/1
50 TABLET ORAL
Qty: 60 | Refills: 1 | Status: ACTIVE | COMMUNITY
Start: 2024-04-22

## 2024-05-21 RX ORDER — PROPRANOLOL HYDROCHLORIDE 10 MG/1
10 TABLET ORAL
Qty: 180 | Refills: 1 | Status: ACTIVE | COMMUNITY
Start: 2024-04-08 | End: 1900-01-01

## 2024-05-21 RX ORDER — ALPRAZOLAM 1 MG/1
1 TABLET ORAL
Qty: 60 | Refills: 0 | Status: ACTIVE | COMMUNITY
Start: 2023-08-10

## 2024-05-21 RX ORDER — PRAZOSIN HYDROCHLORIDE 2 MG/1
2 CAPSULE ORAL
Qty: 90 | Refills: 1 | Status: ACTIVE | COMMUNITY
Start: 2024-04-03 | End: 1900-01-01

## 2024-05-21 RX ORDER — ESCITALOPRAM OXALATE 20 MG/1
20 TABLET ORAL
Qty: 90 | Refills: 1 | Status: ACTIVE | COMMUNITY
Start: 2023-11-21

## 2024-05-21 RX ORDER — ESCITALOPRAM OXALATE 10 MG/1
10 TABLET ORAL DAILY
Qty: 30 | Refills: 3 | Status: ACTIVE | COMMUNITY
Start: 2024-04-03

## 2024-05-21 RX ORDER — HYDROXYZINE PAMOATE 25 MG/1
25 CAPSULE ORAL
Qty: 60 | Refills: 2 | Status: ACTIVE | COMMUNITY
Start: 2024-05-07

## 2024-05-21 NOTE — HISTORY OF PRESENT ILLNESS
[FreeTextEntry1] :  MARIANA is being seen today for a follow up visit for continuation of pharmacologic/psychiatric management.   Treatment plan changes since last visit: Increased Trazodone, started on Hydroxyzine   CC:  Sleep improved   MARIANA reports compliance with the prescribed medication regime, tolerating well, no reported medication issues. Reports propranolol increase was helpful with reducing somatic symptoms of anxiety particularly used prior to work. Notes improved in sleep latency quality and duration with routine use of Trazodone, mild tiredness in AM. Not finding to be problematic. She has not experienced any nightmares while on Prazosin. Episode of urinary incontinence overnight, attributes to deep sleep and his of urgency "weak bladder," since childbirth. Notes anxiety frequency and intensity has improved, no panic attacks, routine use of Propranolol typically once a daily. Significantly reduction in use of Alprazolam due to improvement in anxiety, able to drive short distance with friend in car, overcoming fear of driving due to panic episodes. Started Qsymia for weight loss, managed by weight management physician.  She denies No SIIP/HIIP/ SIB, hypomanic/ manic mood features, AVH, No delusions or paranoia were elicited. Remains in good overall medical health. Denies increased or problematic substance use. Engaged in work, social settings and interpersonal relationships, psychotherapy, and maintains functional ADLs Expresses future motivation and engages well in interview.

## 2024-05-21 NOTE — DISCUSSION/SUMMARY
[FreeTextEntry1] :  MARIANA is being seen today for a follow up visit for continuation of psychiatric/pharmacologic management.    [facility] ISTOP:   Reference #: [id number] 05/08/2024	05/15/2024	qsymia 7.5 mg-46 mg capsule	30	30	FelizZeyad carrillo [facility] (DO) 05/07/2024	05/08/2024	alprazolam 1 mg tablet	60	30	Zenia Lynn 04/03/2024	04/04/2024	alprazolam 1 mg tablet	60	30	Zenia Lynn	  MPRESSION: CHELA, Panic Disorder , Mood disorder, unspecified  DDX: Insomnia related to psychiatric condition, Adjustment Disorder, PTSD   Assessment: Mood and anxiety sxs: Considerable improvement in mood, noted affective brightening, anxiety improved. No SIIP/HIIP/SIB/AVH/Psychosis. No evidence of benzodiazepine abuse/misuse, No episodes of panic, rare use of Alprazolam. Propranolol helpful utilizing daily Sleep sxs: Nightmares and sleep quality/latency have improved with use of Trazodone/Prazosin Safety concerns: No identifiable risk, patient is not a harm risk to self or others. Functional status: No identifiable impairments Medications:  Standing: Lexapro 30mg orally once daily; mood PRN: Alprazolam 1 mg orally as needed; Anxiety; Propranolol 30mg orally (1-2tabs) once daily as needed for anxiety;  Hydroxyzine 25mg (1-2 tablets) orally every 6 hours, as needed for anxiety, Trazodone 50mg (1-2 tabs) at bedtime as needed for sleep initiation

## 2024-05-21 NOTE — PLAN
[No] : No [Medication education provided] : Medication education provided. [Rationale for medication choices, possible risks/precautions, benefits, alternative treatment choices, and consequences of non-treatment discussed] : Rationale for medication choices, possible risks/precautions, benefits, alternative treatment choices, and consequences of non-treatment discussed with patient/family/caregiver  [Order(s) for medication placed in [facility] EHR] : Medication [FreeTextEntry4] : INTERVENTION: BRIEF CBT, SUPPORTIVE PSYCHOTHERAPY, BEHAVIORAL MODIFICATION     PROBLEM: Anxiety/ Depression/ PTSD   LONG TERM GOAL: Alleviate psychiatric symptoms so as the patient may return to normal functioning.   TIME SPENT PERFORMING THERAPY: 16 MINUTES   DISCUSSION: Explored patient desire for change including functional achievable steps for improvement in psychiatric symptoms. Explored a best practice approach to improve both symptoms and functional impact associated with patients' diagnosis. Examined patient outlook on mental health and well-being, including discussion of personal strengths and plan for success despite stressors. Provider validated patient's emotion/experience. Explore solution focused strategies to reduced stressors including behavioral/ thought modifications. Focused on positive achievements towards mental health goals, and instilled hope. Reviewed/reinforced psychoeducation on medication regime, diagnosis and treatment plan. Discussed/reviewed the incorporation of holistic modalities to enhance mood. Supportive listening and interactive feedback were provided. Focused on positive achievements towards mental health goals, and instilled hope.      PATIENT RESPONSE: Patient participated actively and remained engaged in today's session, patient is progressing well towards long-term treatment goal.      [FreeTextEntry5] : Problems: 1. Mood and Anxiety Management - Continue the current medication regimen - Will plan to add mood stabilizer in future; hold off for now as patient is now on Qsymia - Continue the current medication regime for prn's  - Monitor symptoms and consider further evaluation/medication intervention if symptoms worsen/persist - Encourage ongoing engagement in outpatient individual psychotherapy - Discussed healthy daily routines including: exercise, mindfulness activities, and leisure activities - Continue outpatient psychotherapy for ongoing coping skills and emotional support.  Sleep management: - Prazosin 3 mg once at bedtime, Trazodone 50mg (1-2 tabs) orally once at bedtime as needed; monitor for urinary incontinence may be side effect of Prazosin.  - Continue Vistaril 25mg (1-2 tabs) at bedtime as needed - Review and reinforce sleep hygiene practices, including aromatherapy, routine sleep and wake times, changing environment, and mindfulness meditation before sleep  - Educated patient on sleep hygiene and importance of maintaining sleep routine, goal to obtain 8 hours of sleep nightly  Wt. Loss Discussed with PCP Started Qsymia; aware of stimulant which may exacerbated anxiety; Additionally will hold off on addition of mood stabilizer at this time.  Medication changes this visit:    Follow-up and Monitoring:   - Patient was given opportunity to ask questions, all questions were answered, patient is agreeable to treatment plan   - Psychoeducation and supportive therapy provided, and discussed rationale for the recommended medications   - Educated patient of importance of remaining abstinent from drugs and alcohol, risks of short-term and long-term use, hazards of combining with medications   - Emergency procedures were discussed: Pt educated to call [phone], 911 or go to the nearest ER for worsening symptoms/suicidal/homicidal ideations   - Follow-up appointment scheduled for : 4 weeks     Plan for next visit:   - Assess patient's response to medication changes and overall mental health status.   - Address any new symptoms or concerns that may arise.

## 2024-05-21 NOTE — SOCIAL HISTORY
[FreeTextEntry1] : Patient is a 48 year old female, , partnered, working full time, no dependents, residing in a private home in [place] [facility]. She has hx of trauma/grief surrounding passing of her child. She expresses adequate social support and has no access to a firearm. She denies problematic ETOH use, illicit drug use, utilizes nicotine 1ppd.

## 2024-05-21 NOTE — REASON FOR VISIT
[Patient preference] : as per patient preference [Telehealth (audio & video) - Individual/Group] : This visit was provided via telehealth using real-time 2-way audio visual technology. [Other Location: e.g. Home (Enter Location, City,State)___] : The provider was located at [unfilled]. [Home] : The patient, [unfilled], was located at home, [unfilled], at the time of the visit. [Patient] : Patient [FreeTextEntry4] : 1000 [FreeTextEntry5] : 1035 [FreeTextEntry1] : Mary Ellen is a 49 YO female presenting today for a follow up visit for management of psychiatric symptoms

## 2024-06-18 ENCOUNTER — APPOINTMENT (OUTPATIENT)
Dept: PSYCHIATRY | Facility: CLINIC | Age: 49
End: 2024-06-18
Payer: COMMERCIAL

## 2024-06-18 DIAGNOSIS — F51.05 INSOMNIA DUE TO OTHER MENTAL DISORDER: ICD-10-CM

## 2024-06-18 DIAGNOSIS — F41.0 GENERALIZED ANXIETY DISORDER: ICD-10-CM

## 2024-06-18 DIAGNOSIS — F41.1 GENERALIZED ANXIETY DISORDER: ICD-10-CM

## 2024-06-18 DIAGNOSIS — F43.81 PROLONGED GRIEF DISORDER: ICD-10-CM

## 2024-06-18 DIAGNOSIS — F43.10 POST-TRAUMATIC STRESS DISORDER, UNSPECIFIED: ICD-10-CM

## 2024-06-18 PROCEDURE — 99214 OFFICE O/P EST MOD 30 MIN: CPT | Mod: 95

## 2024-06-18 PROCEDURE — 90833 PSYTX W PT W E/M 30 MIN: CPT | Mod: 95

## 2024-06-18 RX ORDER — PRAZOSIN HYDROCHLORIDE 1 MG/1
1 CAPSULE ORAL
Qty: 90 | Refills: 0 | Status: DISCONTINUED | COMMUNITY
Start: 2024-05-07 | End: 2024-06-18

## 2024-06-18 NOTE — REASON FOR VISIT
[Patient preference] : as per patient preference [Telehealth (audio & video) - Individual/Group] : This visit was provided via telehealth using real-time 2-way audio visual technology. [Other Location: e.g. Home (Enter Location, City,State)___] : The provider was located at [unfilled]. [Home] : The patient, [unfilled], was located at home, [unfilled], at the time of the visit. [Patient] : Patient [FreeTextEntry4] : 1086 [FreeTextEntry5] : 8629 [FreeTextEntry1] : Mary Ellen is a 47 YO female presenting today for a follow up visit for management of psychiatric symptoms

## 2024-06-18 NOTE — PLAN
[No] : No [Medication education provided] : Medication education provided. [Rationale for medication choices, possible risks/precautions, benefits, alternative treatment choices, and consequences of non-treatment discussed] : Rationale for medication choices, possible risks/precautions, benefits, alternative treatment choices, and consequences of non-treatment discussed with patient/family/caregiver  [Order(s) for medication placed in [facility] EHR] : Medication [FreeTextEntry4] : INTERVENTION: BRIEF CBT, SUPPORTIVE PSYCHOTHERAPY, BEHAVIORAL MODIFICATION     PROBLEM: Anxiety/ Depression/ PTSD   LONG TERM GOAL: Alleviate psychiatric symptoms so as the patient may return to normal functioning.   TIME SPENT PERFORMING THERAPY: 16 MINUTES   DISCUSSION: Explored patient desire for change including functional achievable steps for improvement in psychiatric symptoms. Explored a best practice approach to improve both symptoms and functional impact associated with patients' diagnosis. Examined patient outlook on mental health and well-being, including discussion of personal strengths and plan for success despite stressors. Provider validated patient's emotion/experience. Explore solution focused strategies to reduced stressors including behavioral/ thought modifications. Focused on positive achievements towards mental health goals, and instilled hope. Reviewed/reinforced psychoeducation on medication regime, diagnosis and treatment plan. Discussed/reviewed the incorporation of holistic modalities to enhance mood. Supportive listening and interactive feedback were provided. Focused on positive achievements towards mental health goals, and instilled hope.      PATIENT RESPONSE: Patient participated actively and remained engaged in today's session, patient is progressing well towards long-term treatment goal.      [FreeTextEntry5] : Problems: 1. Mood and Anxiety Management - Continue the current medication regimen - Will plan to add mood stabilizer in future; hold off for now as patient is now on Qsymia - Continue the current medication regime for prn's  - Monitor symptoms and consider further evaluation/medication intervention if symptoms worsen/persist - Encourage ongoing engagement in outpatient individual psychotherapy - Discussed healthy daily routines including: exercise, mindfulness activities, and leisure activities - Continue outpatient psychotherapy for ongoing coping skills and emotional support.  Sleep management: - Increase Prazosin 4 mg once at bedtime, Trazodone 50mg (1-2 tabs) orally once at bedtime as needed; monitor for urinary incontinence may be side effect of Prazosin.  - Continue Vistaril 25mg (1-2 tabs) at bedtime as needed - Review and reinforce sleep hygiene practices, including aromatherapy, routine sleep and wake times, changing environment, and mindfulness meditation before sleep  - Educated patient on sleep hygiene and importance of maintaining sleep routine, goal to obtain 8 hours of sleep nightly  Nutrition: Discussed importance of small frequent meals in order to maintain adequate/ caloric intake Discussed with PCP Started Qsymia; aware of stimulant which may exacerbated anxiety; Additionally will hold off on addition of mood stabilizer at this time.  Medication changes this visit:    Follow-up and Monitoring:   - Patient was given opportunity to ask questions, all questions were answered, patient is agreeable to treatment plan   - Psychoeducation and supportive therapy provided, and discussed rationale for the recommended medications   - Educated patient of importance of remaining abstinent from drugs and alcohol, risks of short-term and long-term use, hazards of combining with medications   - Emergency procedures were discussed: Pt educated to call [phone], 911 or go to the nearest ER for worsening symptoms/suicidal/homicidal ideations   - Follow-up appointment scheduled for : 4 weeks     Plan for next visit:   - Assess patient's response to medication changes and overall mental health status.   - Address any new symptoms or concerns that may arise.

## 2024-06-18 NOTE — HISTORY OF PRESENT ILLNESS
[FreeTextEntry1] :  MARIANA is being seen today for a follow up visit for continuation of pharmacologic/psychiatric management.   Treatment plan changes since last visit: Increased Trazodone, started on Hydroxyzine   CC: Difficult day yesterday, have been mostly today; medications have alleviated symptoms   MARIANA reports compliance with the prescribed medication regime, tolerating well, no reported medication issues. Reports yesterday had an incident feeling shaky and precipitated to panic attack. Although, she had been generally doing well with mood and anxiety. Continues to experience anxiety while driving though she is able to drive short distances, non-highway driving. Notes improved in sleep latency quality and duration with routine use of Trazodone/ Prazosin at night. Mentions recurrent vived dreams/nightmares. No further episodes of urinary incontinence at night, is able to wake up to go to the bathroom. Notes anxiety frequency and intensity has improved, routine use of Propranolol typically once a daily. Significantly reduction in use of Alprazolam due to improvement in anxiety, able to drive short distance with friend in car, overcoming fear of driving due to panic episodes. Remains on Qsymia, managed by weight management physician.  She denies No SIIP/HIIP/ SIB, hypomanic/ manic mood features, AVH, No delusions or paranoia were elicited. Remains in good overall medical health. Denies increased or problematic substance use. Engaged in work, social settings and interpersonal relationships, psychotherapy, and maintains functional ADLs Expresses future motivation and engages well in interview.

## 2024-06-18 NOTE — SOCIAL HISTORY
[FreeTextEntry1] : Patient is a 48 year old female, , partnered, working full time, no dependents, residing in a private home in [place] [place]. She has hx of trauma/grief surrounding passing of her child. She expresses adequate social support and has no access to a firearm. She denies problematic ETOH use, illicit drug use, utilizes nicotine 1ppd.

## 2024-07-16 ENCOUNTER — APPOINTMENT (OUTPATIENT)
Dept: PSYCHIATRY | Facility: CLINIC | Age: 49
End: 2024-07-16
Payer: COMMERCIAL

## 2024-07-16 DIAGNOSIS — F43.10 POST-TRAUMATIC STRESS DISORDER, UNSPECIFIED: ICD-10-CM

## 2024-07-16 DIAGNOSIS — F51.05 INSOMNIA DUE TO OTHER MENTAL DISORDER: ICD-10-CM

## 2024-07-16 DIAGNOSIS — F43.81 PROLONGED GRIEF DISORDER: ICD-10-CM

## 2024-07-16 DIAGNOSIS — F41.0 GENERALIZED ANXIETY DISORDER: ICD-10-CM

## 2024-07-16 DIAGNOSIS — F41.1 GENERALIZED ANXIETY DISORDER: ICD-10-CM

## 2024-07-16 PROCEDURE — 99214 OFFICE O/P EST MOD 30 MIN: CPT | Mod: 95

## 2024-07-16 PROCEDURE — 90833 PSYTX W PT W E/M 30 MIN: CPT | Mod: 95

## 2024-07-16 RX ORDER — PRAZOSIN HYDROCHLORIDE 1 MG/1
1 CAPSULE ORAL
Qty: 90 | Refills: 1 | Status: DISCONTINUED | COMMUNITY
Start: 2024-07-16 | End: 2024-07-16

## 2024-09-10 ENCOUNTER — APPOINTMENT (OUTPATIENT)
Dept: PSYCHIATRY | Facility: CLINIC | Age: 49
End: 2024-09-10

## 2024-09-10 NOTE — HISTORY OF PRESENT ILLNESS
[FreeTextEntry1] :  MARIANA is being seen today for a follow up visit for continuation of pharmacologic/psychiatric management.   Treatment plan changes since last visit: Increased Trazodone, started on Hydroxyzine   CC: medications have alleviated symptoms   MARIANA reports compliance with the prescribed medication regime, tolerating well, no reported medication issues. Reports yesterday second incident of feeling shaky and precipitated to panic attack. Although, she had been generally doing well with mood and anxiety. She attributes this to lack of food intake throughout the day, mentioning weight loss medication has significantly reduced her appetite and she has lost 22 lbs. Continues to experience anxiety while driving though she is able to drive short distances, non-highway driving. Notes improved in sleep latency quality and duration with routine use of Trazodone/ Prazosin at night. Notes anxiety frequency and intensity has improved, utilizing PRNs as needed. Significantly reduction in use of Alprazolam to 1-2 x weekly. She denies No SIIP/HIIP/ SIB, hypomanic/ manic mood features, AVH, No delusions or paranoia were elicited. Remains in good overall medical health. Being treated by PCP for UTI. Denies increased or problematic substance use. Engaged in work, social settings and interpersonal relationships, psychotherapy, and maintains functional ADLs Expresses future motivation and engages well in interview.

## 2024-09-10 NOTE — SOCIAL HISTORY
[FreeTextEntry1] : Patient is a 48 year old female, , partnered, working full time, no dependents, residing in a private home in [facility]. She has hx of trauma/grief surrounding passing of her child. She expresses adequate social support and has no access to a firearm. She denies problematic ETOH use, illicit drug use, utilizes nicotine 1ppd.

## 2024-09-10 NOTE — DISCUSSION/SUMMARY
[FreeTextEntry1] :  MARIANA is being seen today for a follow up visit for continuation of psychiatric/pharmacologic management.   [facility] ISTOP:  Reference #: [id number] 07/16/2024	07/16/2024	alprazolam 1 mg tablet	60	30	Zenia Lynn    MPRESSION: CHELA, Panic Disorder , Mood disorder, unspecified  DDX: Insomnia related to psychiatric condition, Adjustment Disorder, PTSD   Assessment: Mood and anxiety sxs: Stable with current medication regime. No SIIP/HIIP/SIB/AVH/Psychosis. No evidence of benzodiazepine abuse/misuse, Episode of panic reported and alleviated with Alprazolam. Utilizing Alprazolam 1-2x weekly. Propranolol helpful utilizing daily Sleep sxs: Sleep quality/latency have improved with use of Trazodone/Prazosin; recurrent nightmares are distressing Safety concerns: No identifiable risk, patient is not a harm risk to self or others. Functional status: No identifiable impairments Medications:  Standing: Lexapro 30mg orally once daily; mood, Prazosin 4mg orally at bedtime; nightmares PRN: Alprazolam 1 mg orally as needed; Anxiety; Propranolol 30mg orally (1-2tabs) once daily as needed for anxiety;  Hydroxyzine 25mg (1-2 tablets) orally every 6 hours, as needed for anxiety, Trazodone 50mg (1-2 tabs) at bedtime as needed for sleep initiation OTC: L-theanine

## 2024-09-10 NOTE — PLAN
[FreeTextEntry4] : INTERVENTION: BRIEF CBT, SUPPORTIVE PSYCHOTHERAPY, BEHAVIORAL MODIFICATION     PROBLEM: Anxiety/ Depression/ PTSD   LONG TERM GOAL: Alleviate psychiatric symptoms so as the patient may return to normal functioning.   TIME SPENT PERFORMING THERAPY: 16 MINUTES   DISCUSSION: Explored patient desire for change including functional achievable steps for improvement in psychiatric symptoms. Explored a best practice approach to improve both symptoms and functional impact associated with patients' diagnosis. Examined patient outlook on mental health and well-being, including discussion of personal strengths and plan for success despite stressors. Provider validated patient's emotion/experience. Explore solution focused strategies to reduced stressors including behavioral/ thought modifications. Focused on positive achievements towards mental health goals, and instilled hope. Reviewed/reinforced psychoeducation on medication regime, diagnosis and treatment plan. Discussed/reviewed the incorporation of holistic modalities to enhance mood. Supportive listening and interactive feedback were provided. Focused on positive achievements towards mental health goals, and instilled hope.      PATIENT RESPONSE: Patient participated actively and remained engaged in today's session, patient is progressing well towards long-term treatment goal.      [FreeTextEntry5] : Problems: 1. Mood and Anxiety Management - Continue the current medication regimen - Will plan to add mood stabilizer in future; hold off for now as patient is now on Qsymia - Continue the current medication regime for prn's  - Monitor symptoms and consider further evaluation/medication intervention if symptoms worsen/persist - Encourage ongoing engagement in outpatient individual psychotherapy - Discussed healthy daily routines including: exercise, mindfulness activities, and leisure activities - Continue outpatient psychotherapy for ongoing coping skills and emotional support.  Sleep management: - Continue Prazosin 4 mg once at bedtime, Trazodone 50mg (1-2 tabs) orally once at bedtime as needed; monitor for urinary incontinence may be side effect of Prazosin.  - Continue Vistaril 25mg (1-2 tabs) at bedtime as needed - Review and reinforce sleep hygiene practices, including aromatherapy, routine sleep and wake times, changing environment, and mindfulness meditation before sleep  - Educated patient on sleep hygiene and importance of maintaining sleep routine, goal to obtain 8 hours of sleep nightly  Nutrition: Discussed importance of small frequent meals in order to maintain adequate/ caloric intake  Medication changes this visit: None   Follow-up and Monitoring:   - Patient was given opportunity to ask questions, all questions were answered, patient is agreeable to treatment plan   - Psychoeducation and supportive therapy provided, and discussed rationale for the recommended medications   - Educated patient of importance of remaining abstinent from drugs and alcohol, risks of short-term and long-term use, hazards of combining with medications   - Emergency procedures were discussed: Pt educated to call [phone], [phone] or go to the nearest ER for worsening symptoms/suicidal/homicidal ideations   - Follow-up appointment scheduled for : 6 weeks     Plan for next visit:   - Assess patient's response to medication changes and overall mental health status.   - Address any new symptoms or concerns that may arise.

## 2024-10-07 ENCOUNTER — APPOINTMENT (OUTPATIENT)
Dept: PSYCHIATRY | Facility: CLINIC | Age: 49
End: 2024-10-07
Payer: COMMERCIAL

## 2024-10-07 DIAGNOSIS — F51.05 INSOMNIA DUE TO OTHER MENTAL DISORDER: ICD-10-CM

## 2024-10-07 DIAGNOSIS — F43.10 POST-TRAUMATIC STRESS DISORDER, UNSPECIFIED: ICD-10-CM

## 2024-10-07 DIAGNOSIS — F41.1 GENERALIZED ANXIETY DISORDER: ICD-10-CM

## 2024-10-07 DIAGNOSIS — F41.0 GENERALIZED ANXIETY DISORDER: ICD-10-CM

## 2024-10-07 DIAGNOSIS — F43.29 ADJUSTMENT DISORDER WITH OTHER SYMPTOMS: ICD-10-CM

## 2024-10-07 PROCEDURE — 99214 OFFICE O/P EST MOD 30 MIN: CPT | Mod: 95

## 2024-10-23 NOTE — HISTORY OF PRESENT ILLNESS
LM to call office to schedule appt.    Referral from [facility] for I&D of right arm wound.   
Patient scheduled   
[FreeTextEntry1] : +increased anxiety\par +increased bad dreams, even while napping. For example, pt had a dream where her biological father called her and her mother was yelling at her. She states that this made her feel bad in the dream. She's never met her biological. She had another dream where she had paralysis, couldn’t control when she couldn't talk. \par -she wakes up a little earlier than usual and isn't able to go back to sleep.\par +panic attack on Saturday after going to her daughters favorite restaurant, she's been having them every other days. Symptoms: heart racing, throat closing, feeling as if she's going to die, sweating, face goes white\par \par All symptoms worsened within the last month

## 2024-10-25 ENCOUNTER — APPOINTMENT (OUTPATIENT)
Dept: PSYCHIATRY | Facility: CLINIC | Age: 49
End: 2024-10-25

## 2024-10-25 DIAGNOSIS — F43.22 ADJUSTMENT DISORDER WITH ANXIETY: ICD-10-CM

## 2024-10-25 PROCEDURE — 90791 PSYCH DIAGNOSTIC EVALUATION: CPT | Mod: 95

## 2024-10-29 ENCOUNTER — APPOINTMENT (OUTPATIENT)
Dept: AFTER HOURS CARE | Facility: EMERGENCY ROOM | Age: 49
End: 2024-10-29

## 2024-10-29 DIAGNOSIS — R06.2 WHEEZING: ICD-10-CM

## 2024-10-29 PROCEDURE — 99204 OFFICE O/P NEW MOD 45 MIN: CPT

## 2024-10-29 RX ORDER — ALBUTEROL SULFATE 2.5 MG/3ML
(2.5 MG/3ML) SOLUTION RESPIRATORY (INHALATION)
Qty: 18 | Refills: 0 | Status: ACTIVE | COMMUNITY
Start: 2024-10-29 | End: 1900-01-01

## 2024-10-29 RX ORDER — AZITHROMYCIN 250 MG/1
250 TABLET, FILM COATED ORAL
Qty: 1 | Refills: 0 | Status: ACTIVE | COMMUNITY
Start: 2024-10-29 | End: 1900-01-01

## 2024-11-01 ENCOUNTER — APPOINTMENT (OUTPATIENT)
Dept: PSYCHIATRY | Facility: CLINIC | Age: 49
End: 2024-11-01
Payer: COMMERCIAL

## 2024-11-01 DIAGNOSIS — F43.10 POST-TRAUMATIC STRESS DISORDER, UNSPECIFIED: ICD-10-CM

## 2024-11-01 PROBLEM — F43.22 ADJUSTMENT DISORDER WITH ANXIOUS MOOD: Status: ACTIVE | Noted: 2024-11-01

## 2024-11-01 PROCEDURE — 90837 PSYTX W PT 60 MINUTES: CPT | Mod: 95

## 2024-11-08 ENCOUNTER — APPOINTMENT (OUTPATIENT)
Dept: PSYCHIATRY | Facility: CLINIC | Age: 49
End: 2024-11-08

## 2024-11-08 DIAGNOSIS — F43.10 POST-TRAUMATIC STRESS DISORDER, UNSPECIFIED: ICD-10-CM

## 2024-11-08 PROCEDURE — 90834 PSYTX W PT 45 MINUTES: CPT | Mod: 95

## 2024-11-15 ENCOUNTER — APPOINTMENT (OUTPATIENT)
Dept: PSYCHIATRY | Facility: CLINIC | Age: 49
End: 2024-11-15
Payer: COMMERCIAL

## 2024-11-15 DIAGNOSIS — F43.10 POST-TRAUMATIC STRESS DISORDER, UNSPECIFIED: ICD-10-CM

## 2024-11-15 PROCEDURE — 90837 PSYTX W PT 60 MINUTES: CPT | Mod: 95

## 2024-11-26 ENCOUNTER — APPOINTMENT (OUTPATIENT)
Dept: PSYCHIATRY | Facility: CLINIC | Age: 49
End: 2024-11-26
Payer: COMMERCIAL

## 2024-11-26 DIAGNOSIS — F43.10 POST-TRAUMATIC STRESS DISORDER, UNSPECIFIED: ICD-10-CM

## 2024-11-26 PROCEDURE — 90834 PSYTX W PT 45 MINUTES: CPT | Mod: 95

## 2024-12-06 ENCOUNTER — APPOINTMENT (OUTPATIENT)
Dept: PSYCHIATRY | Facility: CLINIC | Age: 49
End: 2024-12-06
Payer: COMMERCIAL

## 2024-12-06 DIAGNOSIS — F43.10 POST-TRAUMATIC STRESS DISORDER, UNSPECIFIED: ICD-10-CM

## 2024-12-06 PROCEDURE — 90834 PSYTX W PT 45 MINUTES: CPT | Mod: 95

## 2024-12-12 ENCOUNTER — APPOINTMENT (OUTPATIENT)
Dept: PSYCHIATRY | Facility: CLINIC | Age: 49
End: 2024-12-12
Payer: COMMERCIAL

## 2024-12-12 DIAGNOSIS — F43.10 POST-TRAUMATIC STRESS DISORDER, UNSPECIFIED: ICD-10-CM

## 2024-12-12 PROCEDURE — 90837 PSYTX W PT 60 MINUTES: CPT | Mod: 95

## 2024-12-20 ENCOUNTER — APPOINTMENT (OUTPATIENT)
Dept: PSYCHIATRY | Facility: CLINIC | Age: 49
End: 2024-12-20

## 2025-01-03 ENCOUNTER — APPOINTMENT (OUTPATIENT)
Dept: PSYCHIATRY | Facility: CLINIC | Age: 50
End: 2025-01-03
Payer: COMMERCIAL

## 2025-01-03 DIAGNOSIS — F43.10 POST-TRAUMATIC STRESS DISORDER, UNSPECIFIED: ICD-10-CM

## 2025-01-03 PROCEDURE — 90834 PSYTX W PT 45 MINUTES: CPT | Mod: 95

## 2025-01-17 ENCOUNTER — APPOINTMENT (OUTPATIENT)
Dept: PSYCHIATRY | Facility: CLINIC | Age: 50
End: 2025-01-17

## 2025-01-17 PROCEDURE — 90834 PSYTX W PT 45 MINUTES: CPT | Mod: 95

## 2025-01-24 ENCOUNTER — APPOINTMENT (OUTPATIENT)
Dept: PSYCHIATRY | Facility: CLINIC | Age: 50
End: 2025-01-24

## 2025-01-24 PROCEDURE — 90834 PSYTX W PT 45 MINUTES: CPT | Mod: 95

## 2025-01-31 ENCOUNTER — APPOINTMENT (OUTPATIENT)
Dept: PSYCHIATRY | Facility: CLINIC | Age: 50
End: 2025-01-31

## 2025-02-07 ENCOUNTER — APPOINTMENT (OUTPATIENT)
Dept: PSYCHIATRY | Facility: CLINIC | Age: 50
End: 2025-02-07
Payer: COMMERCIAL

## 2025-02-07 PROCEDURE — 90834 PSYTX W PT 45 MINUTES: CPT | Mod: 95

## 2025-02-12 ENCOUNTER — APPOINTMENT (OUTPATIENT)
Dept: PSYCHIATRY | Facility: CLINIC | Age: 50
End: 2025-02-12

## 2025-02-12 DIAGNOSIS — F43.81 PROLONGED GRIEF DISORDER: ICD-10-CM

## 2025-02-12 DIAGNOSIS — F43.10 POST-TRAUMATIC STRESS DISORDER, UNSPECIFIED: ICD-10-CM

## 2025-02-12 PROCEDURE — 99214 OFFICE O/P EST MOD 30 MIN: CPT | Mod: 95

## 2025-02-24 ENCOUNTER — APPOINTMENT (OUTPATIENT)
Dept: PSYCHIATRY | Facility: CLINIC | Age: 50
End: 2025-02-24
Payer: COMMERCIAL

## 2025-02-24 ENCOUNTER — APPOINTMENT (OUTPATIENT)
Dept: PSYCHIATRY | Facility: CLINIC | Age: 50
End: 2025-02-24

## 2025-02-24 DIAGNOSIS — F43.81 PROLONGED GRIEF DISORDER: ICD-10-CM

## 2025-02-24 PROCEDURE — 90837 PSYTX W PT 60 MINUTES: CPT | Mod: 95

## 2025-03-04 ENCOUNTER — APPOINTMENT (OUTPATIENT)
Dept: PSYCHIATRY | Facility: CLINIC | Age: 50
End: 2025-03-04
Payer: COMMERCIAL

## 2025-03-04 PROCEDURE — 90837 PSYTX W PT 60 MINUTES: CPT | Mod: GT,95

## 2025-03-11 ENCOUNTER — APPOINTMENT (OUTPATIENT)
Dept: PSYCHIATRY | Facility: CLINIC | Age: 50
End: 2025-03-11
Payer: COMMERCIAL

## 2025-03-11 DIAGNOSIS — F43.22 ADJUSTMENT DISORDER WITH ANXIETY: ICD-10-CM

## 2025-03-11 PROCEDURE — 90837 PSYTX W PT 60 MINUTES: CPT | Mod: 95

## 2025-03-18 ENCOUNTER — APPOINTMENT (OUTPATIENT)
Dept: PSYCHIATRY | Facility: CLINIC | Age: 50
End: 2025-03-18

## 2025-03-19 ENCOUNTER — APPOINTMENT (OUTPATIENT)
Dept: PSYCHIATRY | Facility: CLINIC | Age: 50
End: 2025-03-19
Payer: COMMERCIAL

## 2025-03-19 PROCEDURE — 90837 PSYTX W PT 60 MINUTES: CPT | Mod: 95

## 2025-03-25 ENCOUNTER — APPOINTMENT (OUTPATIENT)
Dept: PSYCHIATRY | Facility: CLINIC | Age: 50
End: 2025-03-25
Payer: COMMERCIAL

## 2025-03-25 PROCEDURE — 90837 PSYTX W PT 60 MINUTES: CPT | Mod: 95

## 2025-04-01 ENCOUNTER — APPOINTMENT (OUTPATIENT)
Dept: PSYCHIATRY | Facility: CLINIC | Age: 50
End: 2025-04-01
Payer: COMMERCIAL

## 2025-04-01 DIAGNOSIS — F43.10 POST-TRAUMATIC STRESS DISORDER, UNSPECIFIED: ICD-10-CM

## 2025-04-01 PROCEDURE — 90837 PSYTX W PT 60 MINUTES: CPT | Mod: 95

## 2025-04-08 ENCOUNTER — APPOINTMENT (OUTPATIENT)
Dept: PSYCHIATRY | Facility: CLINIC | Age: 50
End: 2025-04-08
Payer: COMMERCIAL

## 2025-04-08 PROCEDURE — 90837 PSYTX W PT 60 MINUTES: CPT | Mod: 95

## 2025-04-15 ENCOUNTER — APPOINTMENT (OUTPATIENT)
Dept: PSYCHIATRY | Facility: CLINIC | Age: 50
End: 2025-04-15

## 2025-04-15 DIAGNOSIS — F41.1 GENERALIZED ANXIETY DISORDER: ICD-10-CM

## 2025-04-15 DIAGNOSIS — F41.0 GENERALIZED ANXIETY DISORDER: ICD-10-CM

## 2025-04-15 DIAGNOSIS — F43.29 ADJUSTMENT DISORDER WITH OTHER SYMPTOMS: ICD-10-CM

## 2025-04-15 PROCEDURE — 90837 PSYTX W PT 60 MINUTES: CPT | Mod: 95

## 2025-04-29 ENCOUNTER — APPOINTMENT (OUTPATIENT)
Dept: PSYCHIATRY | Facility: CLINIC | Age: 50
End: 2025-04-29
Payer: COMMERCIAL

## 2025-04-29 DIAGNOSIS — F43.10 POST-TRAUMATIC STRESS DISORDER, UNSPECIFIED: ICD-10-CM

## 2025-04-29 PROCEDURE — 90837 PSYTX W PT 60 MINUTES: CPT | Mod: 95

## 2025-05-06 ENCOUNTER — APPOINTMENT (OUTPATIENT)
Dept: PSYCHIATRY | Facility: CLINIC | Age: 50
End: 2025-05-06
Payer: COMMERCIAL

## 2025-05-06 PROCEDURE — 90837 PSYTX W PT 60 MINUTES: CPT | Mod: 95

## 2025-05-13 ENCOUNTER — APPOINTMENT (OUTPATIENT)
Dept: PSYCHIATRY | Facility: CLINIC | Age: 50
End: 2025-05-13
Payer: COMMERCIAL

## 2025-05-13 PROCEDURE — 90837 PSYTX W PT 60 MINUTES: CPT | Mod: 95

## 2025-05-20 ENCOUNTER — APPOINTMENT (OUTPATIENT)
Dept: PSYCHIATRY | Facility: CLINIC | Age: 50
End: 2025-05-20
Payer: COMMERCIAL

## 2025-05-20 ENCOUNTER — APPOINTMENT (OUTPATIENT)
Dept: PSYCHIATRY | Facility: CLINIC | Age: 50
End: 2025-05-20

## 2025-05-20 DIAGNOSIS — F43.22 ADJUSTMENT DISORDER WITH ANXIETY: ICD-10-CM

## 2025-05-20 PROCEDURE — 90834 PSYTX W PT 45 MINUTES: CPT | Mod: 95

## 2025-05-27 ENCOUNTER — APPOINTMENT (OUTPATIENT)
Dept: PSYCHIATRY | Facility: CLINIC | Age: 50
End: 2025-05-27
Payer: COMMERCIAL

## 2025-05-27 PROCEDURE — 90837 PSYTX W PT 60 MINUTES: CPT | Mod: 95

## 2025-06-03 ENCOUNTER — APPOINTMENT (OUTPATIENT)
Dept: PSYCHIATRY | Facility: CLINIC | Age: 50
End: 2025-06-03
Payer: COMMERCIAL

## 2025-06-03 DIAGNOSIS — F43.29 ADJUSTMENT DISORDER WITH OTHER SYMPTOMS: ICD-10-CM

## 2025-06-03 PROCEDURE — 90837 PSYTX W PT 60 MINUTES: CPT | Mod: 95

## 2025-06-09 ENCOUNTER — APPOINTMENT (OUTPATIENT)
Dept: PSYCHIATRY | Facility: CLINIC | Age: 50
End: 2025-06-09
Payer: COMMERCIAL

## 2025-06-09 PROCEDURE — 90837 PSYTX W PT 60 MINUTES: CPT | Mod: 95

## 2025-06-11 ENCOUNTER — APPOINTMENT (OUTPATIENT)
Dept: PSYCHIATRY | Facility: CLINIC | Age: 50
End: 2025-06-11
Payer: COMMERCIAL

## 2025-06-11 PROCEDURE — 90837 PSYTX W PT 60 MINUTES: CPT | Mod: 95

## 2025-06-16 ENCOUNTER — APPOINTMENT (OUTPATIENT)
Dept: PSYCHIATRY | Facility: CLINIC | Age: 50
End: 2025-06-16
Payer: COMMERCIAL

## 2025-06-16 PROCEDURE — 90847 FAMILY PSYTX W/PT 50 MIN: CPT | Mod: 95

## 2025-06-17 ENCOUNTER — APPOINTMENT (OUTPATIENT)
Dept: PSYCHIATRY | Facility: CLINIC | Age: 50
End: 2025-06-17
Payer: COMMERCIAL

## 2025-06-17 PROCEDURE — 90837 PSYTX W PT 60 MINUTES: CPT | Mod: 95

## 2025-06-24 ENCOUNTER — APPOINTMENT (OUTPATIENT)
Dept: PSYCHIATRY | Facility: CLINIC | Age: 50
End: 2025-06-24
Payer: COMMERCIAL

## 2025-06-24 PROCEDURE — 90837 PSYTX W PT 60 MINUTES: CPT | Mod: 95

## 2025-07-01 ENCOUNTER — APPOINTMENT (OUTPATIENT)
Dept: PSYCHIATRY | Facility: CLINIC | Age: 50
End: 2025-07-01
Payer: COMMERCIAL

## 2025-07-01 PROCEDURE — 90837 PSYTX W PT 60 MINUTES: CPT | Mod: 95

## 2025-07-02 ENCOUNTER — APPOINTMENT (OUTPATIENT)
Dept: PSYCHIATRY | Facility: CLINIC | Age: 50
End: 2025-07-02
Payer: COMMERCIAL

## 2025-07-02 PROCEDURE — 90837 PSYTX W PT 60 MINUTES: CPT | Mod: 95

## 2025-07-09 ENCOUNTER — APPOINTMENT (OUTPATIENT)
Dept: PSYCHIATRY | Facility: CLINIC | Age: 50
End: 2025-07-09
Payer: COMMERCIAL

## 2025-07-09 PROCEDURE — 90837 PSYTX W PT 60 MINUTES: CPT | Mod: 95

## 2025-07-15 ENCOUNTER — APPOINTMENT (OUTPATIENT)
Dept: PSYCHIATRY | Facility: CLINIC | Age: 50
End: 2025-07-15
Payer: COMMERCIAL

## 2025-07-15 PROCEDURE — 90837 PSYTX W PT 60 MINUTES: CPT | Mod: 95

## 2025-07-22 ENCOUNTER — APPOINTMENT (OUTPATIENT)
Dept: PSYCHIATRY | Facility: CLINIC | Age: 50
End: 2025-07-22
Payer: COMMERCIAL

## 2025-07-22 PROCEDURE — 90837 PSYTX W PT 60 MINUTES: CPT | Mod: 95

## 2025-07-29 ENCOUNTER — APPOINTMENT (OUTPATIENT)
Dept: PSYCHIATRY | Facility: CLINIC | Age: 50
End: 2025-07-29
Payer: COMMERCIAL

## 2025-07-29 PROCEDURE — 90837 PSYTX W PT 60 MINUTES: CPT | Mod: 95

## 2025-08-06 ENCOUNTER — APPOINTMENT (OUTPATIENT)
Dept: PSYCHIATRY | Facility: CLINIC | Age: 50
End: 2025-08-06

## 2025-08-13 ENCOUNTER — APPOINTMENT (OUTPATIENT)
Dept: PSYCHIATRY | Facility: CLINIC | Age: 50
End: 2025-08-13
Payer: COMMERCIAL

## 2025-08-13 DIAGNOSIS — F41.0 GENERALIZED ANXIETY DISORDER: ICD-10-CM

## 2025-08-13 DIAGNOSIS — F43.29 ADJUSTMENT DISORDER WITH OTHER SYMPTOMS: ICD-10-CM

## 2025-08-13 DIAGNOSIS — F41.1 GENERALIZED ANXIETY DISORDER: ICD-10-CM

## 2025-08-13 PROCEDURE — 90837 PSYTX W PT 60 MINUTES: CPT | Mod: 95

## 2025-09-03 ENCOUNTER — APPOINTMENT (OUTPATIENT)
Dept: PSYCHIATRY | Facility: CLINIC | Age: 50
End: 2025-09-03
Payer: COMMERCIAL

## 2025-09-03 DIAGNOSIS — F43.29 ADJUSTMENT DISORDER WITH OTHER SYMPTOMS: ICD-10-CM

## 2025-09-03 PROCEDURE — 90837 PSYTX W PT 60 MINUTES: CPT | Mod: 95

## 2025-09-10 ENCOUNTER — APPOINTMENT (OUTPATIENT)
Dept: PSYCHIATRY | Facility: CLINIC | Age: 50
End: 2025-09-10

## 2025-09-10 DIAGNOSIS — F43.81 PROLONGED GRIEF DISORDER: ICD-10-CM

## 2025-09-10 PROCEDURE — 90837 PSYTX W PT 60 MINUTES: CPT | Mod: 95

## 2025-09-17 ENCOUNTER — APPOINTMENT (OUTPATIENT)
Dept: PSYCHIATRY | Facility: CLINIC | Age: 50
End: 2025-09-17
Payer: COMMERCIAL

## 2025-09-17 PROCEDURE — 90837 PSYTX W PT 60 MINUTES: CPT | Mod: 95
